# Patient Record
Sex: FEMALE | Race: WHITE | NOT HISPANIC OR LATINO
[De-identification: names, ages, dates, MRNs, and addresses within clinical notes are randomized per-mention and may not be internally consistent; named-entity substitution may affect disease eponyms.]

---

## 2017-03-20 ENCOUNTER — RX RENEWAL (OUTPATIENT)
Age: 57
End: 2017-03-20

## 2017-03-30 ENCOUNTER — OUTPATIENT (OUTPATIENT)
Dept: OUTPATIENT SERVICES | Facility: HOSPITAL | Age: 57
LOS: 1 days | Discharge: HOME | End: 2017-03-30

## 2017-03-30 ENCOUNTER — APPOINTMENT (OUTPATIENT)
Dept: ENDOCRINOLOGY | Facility: CLINIC | Age: 57
End: 2017-03-30

## 2017-03-30 VITALS
SYSTOLIC BLOOD PRESSURE: 176 MMHG | WEIGHT: 203 LBS | BODY MASS INDEX: 38.33 KG/M2 | HEIGHT: 61 IN | DIASTOLIC BLOOD PRESSURE: 103 MMHG

## 2017-03-30 VITALS — HEART RATE: 75 BPM

## 2017-03-30 DIAGNOSIS — I10 ESSENTIAL (PRIMARY) HYPERTENSION: ICD-10-CM

## 2017-03-30 RX ORDER — INSULIN LISPRO 100 [IU]/ML
100 INJECTION, SOLUTION INTRAVENOUS; SUBCUTANEOUS
Refills: 0 | Status: ACTIVE | COMMUNITY
Start: 2017-03-30

## 2017-03-30 RX ORDER — LORAZEPAM 1 MG/1
1 TABLET ORAL
Refills: 0 | Status: ACTIVE | COMMUNITY
Start: 2017-03-30

## 2017-03-30 RX ORDER — TORSEMIDE 20 MG/1
20 TABLET ORAL DAILY
Refills: 0 | Status: ACTIVE | COMMUNITY
Start: 2017-03-30

## 2017-03-30 RX ORDER — POTASSIUM CHLORIDE 750 MG/1
10 TABLET, FILM COATED, EXTENDED RELEASE ORAL
Refills: 0 | Status: ACTIVE | COMMUNITY
Start: 2017-03-30

## 2017-03-30 RX ORDER — PANTOPRAZOLE 40 MG/1
40 TABLET, DELAYED RELEASE ORAL TWICE DAILY
Refills: 0 | Status: ACTIVE | COMMUNITY
Start: 2017-03-30

## 2017-03-30 RX ORDER — ISOSORBIDE MONONITRATE 60 MG/1
60 TABLET, EXTENDED RELEASE ORAL DAILY
Refills: 0 | Status: ACTIVE | COMMUNITY
Start: 2017-03-30

## 2017-03-30 RX ORDER — ASPIRIN 325 MG/1
325 TABLET, FILM COATED ORAL DAILY
Qty: 30 | Refills: 0 | Status: ACTIVE | COMMUNITY
Start: 2017-03-30

## 2017-03-30 RX ORDER — NIFEDIPINE 90 MG/1
90 TABLET, EXTENDED RELEASE ORAL DAILY
Qty: 30 | Refills: 3 | Status: ACTIVE | COMMUNITY
Start: 2017-03-30

## 2017-03-30 RX ORDER — FOLIC ACID 1 MG/1
1 TABLET ORAL DAILY
Refills: 0 | Status: ACTIVE | COMMUNITY
Start: 2017-03-30

## 2017-03-30 RX ORDER — SAW PALMETTO 160 MG
500 CAPSULE ORAL DAILY
Refills: 0 | Status: ACTIVE | COMMUNITY
Start: 2017-03-30

## 2017-03-30 RX ORDER — ERGOCALCIFEROL 1.25 MG/1
1.25 MG CAPSULE, LIQUID FILLED ORAL
Qty: 1 | Refills: 0 | Status: ACTIVE | COMMUNITY
Start: 2017-03-30

## 2017-03-30 RX ORDER — PREGABALIN 150 MG/1
150 CAPSULE ORAL TWICE DAILY
Refills: 0 | Status: ACTIVE | COMMUNITY
Start: 2017-03-30

## 2017-03-30 RX ORDER — ESCITALOPRAM OXALATE 20 MG/1
20 TABLET, FILM COATED ORAL DAILY
Refills: 0 | Status: ACTIVE | COMMUNITY
Start: 2017-03-30

## 2017-03-30 RX ORDER — CLONIDINE HYDROCHLORIDE 0.2 MG/1
0.2 TABLET ORAL 3 TIMES DAILY
Refills: 0 | Status: ACTIVE | COMMUNITY
Start: 2017-03-30

## 2017-03-30 RX ORDER — BROMOCRIPTINE MESYLATE 0.8 MG/1
0.8 TABLET ORAL DAILY
Refills: 0 | Status: ACTIVE | COMMUNITY
Start: 2017-03-30

## 2017-03-30 RX ORDER — SUCRALFATE 1 G/10ML
1 SUSPENSION ORAL 4 TIMES DAILY
Refills: 0 | Status: ACTIVE | COMMUNITY
Start: 2017-03-30

## 2017-06-27 DIAGNOSIS — E11.65 TYPE 2 DIABETES MELLITUS WITH HYPERGLYCEMIA: ICD-10-CM

## 2017-06-27 DIAGNOSIS — E78.2 MIXED HYPERLIPIDEMIA: ICD-10-CM

## 2017-06-29 ENCOUNTER — OUTPATIENT (OUTPATIENT)
Dept: OUTPATIENT SERVICES | Facility: HOSPITAL | Age: 57
LOS: 1 days | Discharge: HOME | End: 2017-06-29

## 2017-06-29 ENCOUNTER — APPOINTMENT (OUTPATIENT)
Dept: ENDOCRINOLOGY | Facility: CLINIC | Age: 57
End: 2017-06-29

## 2017-06-29 VITALS
SYSTOLIC BLOOD PRESSURE: 182 MMHG | HEART RATE: 86 BPM | HEIGHT: 61 IN | DIASTOLIC BLOOD PRESSURE: 86 MMHG | BODY MASS INDEX: 38.14 KG/M2 | WEIGHT: 202 LBS

## 2017-06-29 DIAGNOSIS — E11.65 TYPE 2 DIABETES MELLITUS WITH HYPERGLYCEMIA: ICD-10-CM

## 2017-06-29 DIAGNOSIS — Z79.4 TYPE 2 DIABETES MELLITUS WITH HYPERGLYCEMIA: ICD-10-CM

## 2017-07-11 DIAGNOSIS — E66.01 MORBID (SEVERE) OBESITY DUE TO EXCESS CALORIES: ICD-10-CM

## 2017-07-11 DIAGNOSIS — E10.65 TYPE 1 DIABETES MELLITUS WITH HYPERGLYCEMIA: ICD-10-CM

## 2017-07-11 DIAGNOSIS — E78.00 PURE HYPERCHOLESTEROLEMIA, UNSPECIFIED: ICD-10-CM

## 2017-09-08 ENCOUNTER — RX RENEWAL (OUTPATIENT)
Age: 57
End: 2017-09-08

## 2017-09-21 ENCOUNTER — OUTPATIENT (OUTPATIENT)
Dept: OUTPATIENT SERVICES | Facility: HOSPITAL | Age: 57
LOS: 1 days | Discharge: HOME | End: 2017-09-21

## 2017-09-21 ENCOUNTER — APPOINTMENT (OUTPATIENT)
Dept: ENDOCRINOLOGY | Facility: CLINIC | Age: 57
End: 2017-09-21

## 2017-09-21 VITALS
SYSTOLIC BLOOD PRESSURE: 173 MMHG | HEART RATE: 76 BPM | HEIGHT: 61 IN | BODY MASS INDEX: 37.76 KG/M2 | WEIGHT: 200 LBS | DIASTOLIC BLOOD PRESSURE: 97 MMHG

## 2017-09-21 DIAGNOSIS — E66.01 MORBID (SEVERE) OBESITY DUE TO EXCESS CALORIES: ICD-10-CM

## 2017-09-21 DIAGNOSIS — E78.5 HYPERLIPIDEMIA, UNSPECIFIED: ICD-10-CM

## 2017-09-21 DIAGNOSIS — I70.8 ATHEROSCLEROSIS OF OTHER ARTERIES: ICD-10-CM

## 2017-09-21 RX ORDER — SPIRONOLACTONE 50 MG/1
50 TABLET ORAL DAILY
Refills: 0 | Status: ACTIVE | COMMUNITY
Start: 2017-03-30

## 2017-09-21 RX ORDER — LIDOCAINE 5% 700 MG/1
5 PATCH TOPICAL
Refills: 0 | Status: ACTIVE | COMMUNITY
Start: 2017-03-30

## 2017-09-22 PROBLEM — E66.01 MORBID OR SEVERE OBESITY DUE TO EXCESS CALORIES: Status: ACTIVE | Noted: 2017-09-22

## 2017-09-22 PROBLEM — I70.8 ATHEROSCLEROSIS OF ARTERIES: Status: ACTIVE | Noted: 2017-03-20

## 2017-10-05 DIAGNOSIS — E10.65 TYPE 1 DIABETES MELLITUS WITH HYPERGLYCEMIA: ICD-10-CM

## 2017-10-05 DIAGNOSIS — E66.01 MORBID (SEVERE) OBESITY DUE TO EXCESS CALORIES: ICD-10-CM

## 2017-10-05 DIAGNOSIS — E78.2 MIXED HYPERLIPIDEMIA: ICD-10-CM

## 2017-11-08 ENCOUNTER — RX RENEWAL (OUTPATIENT)
Age: 57
End: 2017-11-08

## 2017-11-08 RX ORDER — EZETIMIBE 10 MG/1
10 TABLET ORAL DAILY
Qty: 90 | Refills: 3 | Status: ACTIVE | COMMUNITY
Start: 2017-11-08 | End: 1900-01-01

## 2017-12-14 ENCOUNTER — APPOINTMENT (OUTPATIENT)
Dept: ENDOCRINOLOGY | Facility: CLINIC | Age: 57
End: 2017-12-14

## 2017-12-14 ENCOUNTER — OUTPATIENT (OUTPATIENT)
Dept: OUTPATIENT SERVICES | Facility: HOSPITAL | Age: 57
LOS: 1 days | Discharge: HOME | End: 2017-12-14

## 2017-12-14 VITALS
HEIGHT: 61 IN | WEIGHT: 200 LBS | BODY MASS INDEX: 37.76 KG/M2 | HEART RATE: 76 BPM | RESPIRATION RATE: 16 BRPM | SYSTOLIC BLOOD PRESSURE: 189 MMHG | DIASTOLIC BLOOD PRESSURE: 76 MMHG

## 2017-12-18 DIAGNOSIS — E78.2 MIXED HYPERLIPIDEMIA: ICD-10-CM

## 2017-12-18 DIAGNOSIS — E78.5 HYPERLIPIDEMIA, UNSPECIFIED: ICD-10-CM

## 2017-12-18 DIAGNOSIS — E10.65 TYPE 1 DIABETES MELLITUS WITH HYPERGLYCEMIA: ICD-10-CM

## 2018-04-04 ENCOUNTER — APPOINTMENT (OUTPATIENT)
Dept: ENDOCRINOLOGY | Facility: CLINIC | Age: 58
End: 2018-04-04

## 2018-04-04 ENCOUNTER — OUTPATIENT (OUTPATIENT)
Dept: OUTPATIENT SERVICES | Facility: HOSPITAL | Age: 58
LOS: 1 days | Discharge: HOME | End: 2018-04-04

## 2018-04-04 VITALS
DIASTOLIC BLOOD PRESSURE: 76 MMHG | WEIGHT: 199 LBS | SYSTOLIC BLOOD PRESSURE: 159 MMHG | HEIGHT: 61 IN | HEART RATE: 75 BPM | BODY MASS INDEX: 37.57 KG/M2

## 2018-04-04 DIAGNOSIS — E10.65 TYPE 1 DIABETES MELLITUS WITH HYPERGLYCEMIA: ICD-10-CM

## 2018-04-04 DIAGNOSIS — J45.41 MODERATE PERSISTENT ASTHMA WITH (ACUTE) EXACERBATION: ICD-10-CM

## 2018-04-04 DIAGNOSIS — E11.9 TYPE 2 DIABETES MELLITUS W/OUT COMPLICATIONS: ICD-10-CM

## 2018-04-04 RX ORDER — BLOOD SUGAR DIAGNOSTIC
STRIP MISCELLANEOUS 4 TIMES DAILY
Qty: 400 | Refills: 2 | Status: ACTIVE | COMMUNITY
Start: 2018-04-04 | End: 1900-01-01

## 2018-04-04 RX ORDER — ALBUTEROL SULFATE 2.5 MG/3ML
(2.5 MG/3ML) SOLUTION RESPIRATORY (INHALATION) 4 TIMES DAILY
Qty: 120 | Refills: 4 | Status: ACTIVE | COMMUNITY
Start: 2018-04-04 | End: 1900-01-01

## 2018-04-04 RX ORDER — CANAGLIFLOZIN AND METFORMIN HYDROCHLORIDE 150; 1000 MG/1; MG/1
150-1000 TABLET, FILM COATED ORAL TWICE DAILY
Qty: 180 | Refills: 3 | Status: ACTIVE | COMMUNITY
Start: 2017-03-30 | End: 1900-01-01

## 2018-04-04 RX ORDER — SEMAGLUTIDE 1.34 MG/ML
2 INJECTION, SOLUTION SUBCUTANEOUS WEEKLY
Qty: 3 | Refills: 0 | Status: ACTIVE | COMMUNITY
Start: 2018-04-04 | End: 1900-01-01

## 2018-04-17 ENCOUNTER — RX RENEWAL (OUTPATIENT)
Age: 58
End: 2018-04-17

## 2018-07-19 ENCOUNTER — APPOINTMENT (OUTPATIENT)
Dept: ENDOCRINOLOGY | Facility: CLINIC | Age: 58
End: 2018-07-19

## 2018-07-23 ENCOUNTER — RX RENEWAL (OUTPATIENT)
Age: 58
End: 2018-07-23

## 2018-07-23 RX ORDER — SEMAGLUTIDE 1.34 MG/ML
2 INJECTION, SOLUTION SUBCUTANEOUS WEEKLY
Qty: 6 | Refills: 3 | Status: ACTIVE | COMMUNITY
Start: 2018-07-23 | End: 1900-01-01

## 2019-03-06 ENCOUNTER — RX RENEWAL (OUTPATIENT)
Age: 59
End: 2019-03-06

## 2019-03-06 RX ORDER — CLOPIDOGREL BISULFATE 75 MG/1
75 TABLET, FILM COATED ORAL
Qty: 90 | Refills: 3 | Status: ACTIVE | COMMUNITY
Start: 2017-03-20 | End: 1900-01-01

## 2019-05-20 ENCOUNTER — RX RENEWAL (OUTPATIENT)
Age: 59
End: 2019-05-20

## 2019-05-20 RX ORDER — INSULIN LISPRO 100 [IU]/ML
100 INJECTION, SOLUTION INTRAVENOUS; SUBCUTANEOUS DAILY
Qty: 120 | Refills: 3 | Status: ACTIVE | COMMUNITY
Start: 2018-06-04 | End: 1900-01-01

## 2019-08-22 ENCOUNTER — OUTPATIENT (OUTPATIENT)
Dept: OUTPATIENT SERVICES | Facility: HOSPITAL | Age: 59
LOS: 1 days | End: 2019-08-22
Payer: MEDICARE

## 2019-08-22 VITALS — WEIGHT: 177.91 LBS | HEIGHT: 62 IN

## 2019-08-22 VITALS
HEART RATE: 57 BPM | HEIGHT: 62 IN | TEMPERATURE: 97 F | WEIGHT: 177.91 LBS | DIASTOLIC BLOOD PRESSURE: 72 MMHG | SYSTOLIC BLOOD PRESSURE: 176 MMHG | RESPIRATION RATE: 18 BRPM | OXYGEN SATURATION: 95 %

## 2019-08-22 DIAGNOSIS — Z95.1 PRESENCE OF AORTOCORONARY BYPASS GRAFT: Chronic | ICD-10-CM

## 2019-08-22 DIAGNOSIS — Z90.710 ACQUIRED ABSENCE OF BOTH CERVIX AND UTERUS: Chronic | ICD-10-CM

## 2019-08-22 DIAGNOSIS — Z95.5 PRESENCE OF CORONARY ANGIOPLASTY IMPLANT AND GRAFT: Chronic | ICD-10-CM

## 2019-08-22 DIAGNOSIS — Z98.890 OTHER SPECIFIED POSTPROCEDURAL STATES: Chronic | ICD-10-CM

## 2019-08-22 DIAGNOSIS — Z01.810 ENCOUNTER FOR PREPROCEDURAL CARDIOVASCULAR EXAMINATION: ICD-10-CM

## 2019-08-22 DIAGNOSIS — I73.9 PERIPHERAL VASCULAR DISEASE, UNSPECIFIED: ICD-10-CM

## 2019-08-22 LAB
ANION GAP SERPL CALC-SCNC: 12 MMOL/L — SIGNIFICANT CHANGE UP (ref 5–17)
APTT BLD: 32.4 SEC — SIGNIFICANT CHANGE UP (ref 27.5–36.3)
BUN SERPL-MCNC: 19 MG/DL — SIGNIFICANT CHANGE UP (ref 8–20)
CALCIUM SERPL-MCNC: 9.8 MG/DL — SIGNIFICANT CHANGE UP (ref 8.6–10.2)
CHLORIDE SERPL-SCNC: 102 MMOL/L — SIGNIFICANT CHANGE UP (ref 98–107)
CO2 SERPL-SCNC: 25 MMOL/L — SIGNIFICANT CHANGE UP (ref 22–29)
CREAT SERPL-MCNC: 0.73 MG/DL — SIGNIFICANT CHANGE UP (ref 0.5–1.3)
GLUCOSE SERPL-MCNC: 240 MG/DL — HIGH (ref 70–115)
HBA1C BLD-MCNC: 8.2 % — HIGH (ref 4–5.6)
HCT VFR BLD CALC: 43.2 % — SIGNIFICANT CHANGE UP (ref 34.5–45)
HGB BLD-MCNC: 14.1 G/DL — SIGNIFICANT CHANGE UP (ref 11.5–15.5)
INR BLD: 1 RATIO — SIGNIFICANT CHANGE UP (ref 0.88–1.16)
MAGNESIUM SERPL-MCNC: 1.9 MG/DL — SIGNIFICANT CHANGE UP (ref 1.6–2.6)
MCHC RBC-ENTMCNC: 29.5 PG — SIGNIFICANT CHANGE UP (ref 27–34)
MCHC RBC-ENTMCNC: 32.6 GM/DL — SIGNIFICANT CHANGE UP (ref 32–36)
MCV RBC AUTO: 90.4 FL — SIGNIFICANT CHANGE UP (ref 80–100)
PLATELET # BLD AUTO: 250 K/UL — SIGNIFICANT CHANGE UP (ref 150–400)
POTASSIUM SERPL-MCNC: 4.4 MMOL/L — SIGNIFICANT CHANGE UP (ref 3.5–5.3)
POTASSIUM SERPL-SCNC: 4.4 MMOL/L — SIGNIFICANT CHANGE UP (ref 3.5–5.3)
PROTHROM AB SERPL-ACNC: 11.5 SEC — SIGNIFICANT CHANGE UP (ref 10–12.9)
RBC # BLD: 4.78 M/UL — SIGNIFICANT CHANGE UP (ref 3.8–5.2)
RBC # FLD: 12.3 % — SIGNIFICANT CHANGE UP (ref 10.3–14.5)
SODIUM SERPL-SCNC: 139 MMOL/L — SIGNIFICANT CHANGE UP (ref 135–145)
WBC # BLD: 7.71 K/UL — SIGNIFICANT CHANGE UP (ref 3.8–10.5)
WBC # FLD AUTO: 7.71 K/UL — SIGNIFICANT CHANGE UP (ref 3.8–10.5)

## 2019-08-22 PROCEDURE — 83735 ASSAY OF MAGNESIUM: CPT

## 2019-08-22 PROCEDURE — 85730 THROMBOPLASTIN TIME PARTIAL: CPT

## 2019-08-22 PROCEDURE — 80048 BASIC METABOLIC PNL TOTAL CA: CPT

## 2019-08-22 PROCEDURE — 36415 COLL VENOUS BLD VENIPUNCTURE: CPT

## 2019-08-22 PROCEDURE — 93010 ELECTROCARDIOGRAM REPORT: CPT

## 2019-08-22 PROCEDURE — G0463: CPT

## 2019-08-22 PROCEDURE — 85027 COMPLETE CBC AUTOMATED: CPT

## 2019-08-22 PROCEDURE — 85610 PROTHROMBIN TIME: CPT

## 2019-08-22 PROCEDURE — 93005 ELECTROCARDIOGRAM TRACING: CPT

## 2019-08-22 PROCEDURE — 83036 HEMOGLOBIN GLYCOSYLATED A1C: CPT

## 2019-08-22 RX ORDER — ASPIRIN/CALCIUM CARB/MAGNESIUM 324 MG
0 TABLET ORAL
Qty: 0 | Refills: 0 | DISCHARGE

## 2019-08-22 RX ORDER — CLOPIDOGREL BISULFATE 75 MG/1
1 TABLET, FILM COATED ORAL
Qty: 0 | Refills: 0 | DISCHARGE

## 2019-08-22 NOTE — H&P PST ADULT - NEUROLOGICAL DETAILS
cranial nerves intact/responds to pain/responds to verbal commands/sensation intact/deep reflexes intact/no spontaneous movement/alert and oriented x 3

## 2019-08-22 NOTE — H&P PST ADULT - NSICDXPASTSURGICALHX_GEN_ALL_CORE_FT
PAST SURGICAL HISTORY:  History of procedure for peripheral vascular disease stent to Right Prox SFA DILLAN 9/11/2018    S/P CABG x 4 LIMA to LAD, SVG to OM, D1 and D2 8/9/12 [facility] Dr. Lr    Stented coronary artery 2.5mm x 14mm RESOLUT CHAPINCITO to RPL 4/2014 PAST SURGICAL HISTORY:  History of procedure for peripheral vascular disease stent to Right Prox SFA DILLAN 9/11/2018    S/P CABG x 4 LIMA to LAD, SVG to OM, D1 and D2 8/9/12 [facility] Dr. Lr    S/P hysterectomy     Stented coronary artery 2.5mm x 14mm RESOLUT CHAPINCITO to RPL 4/2014

## 2019-08-22 NOTE — H&P PST ADULT - OTHER CARE PROVIDERS
Dr. Laws (Interventional Cardiologist), Dr. Andrew (Pulmonologist), Dr. Savage (Neurology), Dr. Camejo (Endocrinologist; [facility]

## 2019-08-22 NOTE — H&P PST ADULT - NSICDXPROBLEM_GEN_ALL_CORE_FT
PROBLEM DIAGNOSES  Problem: Claudication  Assessment and Plan: -peripheral angiogram to be done with Dr. Laws  -procedure d/w patient, risks and benefits explaiend, questions answered  -labs, ECG, diagnostics , previous notes from MD all reviewed  -pt currently holding DAPT for biopsy and was advised per MD to hold; call to Dr. Eric pending PROBLEM DIAGNOSES  Problem: Claudication  Assessment and Plan: -peripheral angiogram to be done with Dr. Laws  -procedure d/w patient, risks and benefits explaiend, questions answered  -labs, ECG, diagnostics , previous notes from MD all reviewed  -pt currently holding DAPT for colonscopy; advised to resume at this time; results normal and call to Dr. Laws to inform  -hold invokana two days pre procedure PROBLEM DIAGNOSES  Problem: Claudication  Assessment and Plan: -peripheral angiogram to be done with Dr. Laws  -procedure d/w patient, risks and benefits explained, questions answered  -labs, ECG, diagnostics , previous notes from MD all reviewed  -pt currently holding DAPT for colonscopy; advised to resume at this time; results normal and call to Dr. Laws to inform  -hold invokana two days pre procedure

## 2019-08-22 NOTE — H&P PST ADULT - HISTORY OF PRESENT ILLNESS
Narrative: This is a 60 y/o white F with a PMHx DM (a1c 8.2, on insulin pump, Humalog), HTN, CAD s/p CABG x4 (2012; [facility] Dr. Lr), PCI (CHAPINCITO to RPL; 2014 Dr. Laws), PVD with PPI (Right pSFA 2018) presents today for PST in anticipation of a Peripheral Angiogram due to worsening claudication; class IIB simone.  She reports that she has tried exercise therapy for PVD and walking beyond the onset of claudication to a moderate/intense level of pain, however, it is worsening and she has been experiencing diaphoresis, RLE swelling, and "excruciating" pain with ambulation. She has been using a cane for ambulation daily and reports she has been bearing all of her weight on her left side.    Her LLE angio in the past had an identified right SFA 80% occlusion, left SFA patent, and left posterior tibial artery occlusion in 2016.     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 She has had a holter monitor in place (SVT identified) and has had two ILR's in place as well (19 and 1/3/19; [employer]).  His bilateral carotid dopplers in 2019 revealed mild carotid atherosclerosis.    Peripheral Artery Duplex Scan 2019 revealed patent stent visualized in the prox SFA , Mid PTA and Dayan A were not visualized, Distal PTA was monophasic with reduce flow suggesting significant stenosis proximal and the right AT was patent.     Previous Revascularizations:  CAB2012 [facility] Dr. Lr; ARDON to LAD, SVG to OM, D1 and D2 arteries  PCI: 2014 [facility] CHAPINCITO to RPL 2.5mm x14mm  PPI: 2018 Right Prox SFA Stent COOK -6-40 PTX    ASA: 3  Mallampati: 2  Bleeding Risk: 3.3%  Creatinine: 0.73  GFR: 90 Narrative: This is a 58 y/o white F with a PMHx DM (a1c 8.2, on insulin pump, Humalog), HTN, CAD s/p CABG x4 (2012; [facility] Dr. Lr), PCI (CHAPINCITO to RPL; 2014 Dr. Laws), PVD with PPI (Right pSFA 2018) presents today for PST in anticipation of a Peripheral Angiogram due to worsening claudication; class IIB simone.  She reports that she has tried exercise therapy for PVD and walking beyond the onset of claudication to a moderate/intense level of pain, however, it is worsening and she has been experiencing diaphoresis, RLE swelling, and "excruciating" pain with ambulation. She has been using a cane for ambulation daily and reports she has been bearing all of her weight on her left side.    Her LLE angio in the past had an identified right SFA 80% occlusion, left SFA patent, and left posterior tibial artery occlusion in 2016.     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 She has had a holter monitor in place (SVT identified) and has had two ILR's in place as well (19 and 1/3/19; [employer]).  His bilateral carotid dopplers in 2019 revealed mild carotid atherosclerosis.    Peripheral Artery Duplex Scan 2019 revealed patent stent visualized in the prox SFA , Mid PTA and Dayan A were not visualized, Distal PTA was monophasic with reduce flow suggesting significant stenosis proximal and the right AT was patent.     TTE 2019 revealed normal LV, no regional wall abnormalities, LVEF 65%, mild AR, mild TR, mild LA enlargement    NST 2019 revealed normal LV,no ST segment changes, normal myocardial perfusion on rest and stress images, LVEF 71%    Previous Revascularizations:  CAB2012 [facility] Dr. Lr; ARDON to LAD, SVG to OM, D1 and D2 arteries  PCI: 2014 [facility] [place] CHAPINCITO to RPL 2.5mm x14mm  PPI: 2018 Right Prox SFA Stent COOK -6-40 PTX    ASA: 3  Mallampati: 2  Bleeding Risk: 3.3%  Creatinine: 0.73  GFR: 90 Narrative: This is a 58 y/o white F with a PMHx DM (a1c 8.2, on insulin pump, Humalog), MARYA (on CPAP) HTN, CAD s/p CABG x4 (2012; [facility] Dr. Lr), PCI (CHAPINCITO to RPL; 2014 Dr. Laws), PVD with PPI (Right pSFA 2018) presents today for PST in anticipation of a Peripheral Angiogram due to worsening claudication; class IIB simone.  She reports that she has tried exercise therapy for PVD and walking beyond the onset of claudication to a moderate/intense level of pain, however, it is worsening and she has been experiencing diaphoresis, RLE swelling, and "excruciating" pain with ambulation. She has been using a cane for ambulation daily and reports she has been bearing all of her weight on her left side.    Her LLE angio in the past had an identified right SFA 80% occlusion, left SFA patent, and left posterior tibial artery occlusion in 2016.     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 She has had a holter monitor in place (SVT identified) and has had two ILR's in place as well (19 and 1/3/19; [employer]).  His bilateral carotid dopplers in 2019 revealed mild carotid atherosclerosis.    Peripheral Artery Duplex Scan 2019 revealed patent stent visualized in the prox SFA , Mid PTA and Dayan A were not visualized, Distal PTA was monophasic with reduce flow suggesting significant stenosis proximal and the right AT was patent.     TTE 2019 revealed normal LV, no regional wall abnormalities, LVEF 65%, mild AR, mild TR, mild LA enlargement    NST 2019 revealed normal LV,no ST segment changes, normal myocardial perfusion on rest and stress images, LVEF 71%    Previous Revascularizations:  CAB2012 [facility] Dr. Lr; ARDON to LAD, SVG to OM, D1 and D2 arteries  PCI: 2014 [facility] [place] CHAPINCITO to RPL 2.5mm x14mm  PPI: 2018 Right Prox SFA Stent DS [employer] -6-40 PTX    ASA: 3  Mallampati: 2  Bleeding Risk: 3.3%  Creatinine: 0.73  GFR: 90 Narrative: This is a 60 y/o white F with a PMHx DM (a1c 8.2, on insulin pump, Humalog), MARYA (on CPAP) HTN, CAD s/p CABG x4 (2012; [facility] Dr. Lr), PCI (CHAPINCITO to RPL; 2014 Dr. Laws), PVD with PPI (Right pSFA 2018) presents today for PST in anticipation of a Peripheral Angiogram due to worsening claudication; class IIB simone.  She reports that she has tried exercise therapy for PVD and walking beyond the onset of claudication to a moderate/intense level of pain, however, it is worsening and she has been experiencing diaphoresis, RLE swelling, and "excruciating" pain with ambulation. She has been using a cane for ambulation daily and reports she has been bearing all of her weight on her left side.    Her LLE angio in the past had an identified right SFA 80% occlusion, left SFA patent, and left posterior tibial artery occlusion in 2016.     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 She has had a holter monitor in place (SVT identified) and has had two ILR's in place as well (19 and 1/3/19; [employer]).  His bilateral carotid dopplers in 2019 revealed mild carotid atherosclerosis.      She also had a colonoscopy on 2019 which was normal and she was holding her DAPT for procedure; D/W Dr. Laws and pt to resume DAPT today 19 and advised to take the day of her procedure.    Peripheral Artery Duplex Scan 2019 revealed patent stent visualized in the prox SFA , Mid PTA and Dayan A were not visualized, Distal PTA was monophasic with reduce flow suggesting significant stenosis proximal and the right AT was patent.     TTE 2019 revealed normal LV, no regional wall abnormalities, LVEF 65%, mild AR, mild TR, mild LA enlargement    NST 2019 revealed normal LV,no ST segment changes, normal myocardial perfusion on rest and stress images, LVEF 71%    Previous Revascularizations:  CAB2012 [facility] Dr. Lr; ARDON to LAD, SVG to OM, D1 and D2 arteries  PCI: 2014 Resolut Kevin CHAPINCITO to RPL 2.5mm x14mm  PPI: 2018 Right Prox SFA Stent COOK -6-40 PTX    ASA: 3  Mallampati: 2  Bleeding Risk: 3.3%  Creatinine: 0.73  GFR: 90

## 2019-08-22 NOTE — H&P PST ADULT - NEGATIVE NEUROLOGICAL SYMPTOMS
no weakness/no transient paralysis/no paresthesias/no generalized seizures/no tremors/no vertigo/no loss of sensation/no syncope/no difficulty walking/no focal seizures

## 2019-08-22 NOTE — H&P PST ADULT - VASCULAR
detailed exam as per daughter " she can't go and she took miralax 24 hours ago and enema at noon today and now she's having diarrhea, uncontrollable '

## 2019-08-22 NOTE — H&P PST ADULT - RS GEN PE MLT RESP DETAILS PC
normal/no chest wall tenderness/clear to auscultation bilaterally/airway patent/good air movement/breath sounds equal/respirations non-labored

## 2019-08-22 NOTE — H&P PST ADULT - NSICDXPASTMEDICALHX_GEN_ALL_CORE_FT
PAST MEDICAL HISTORY:  CAD (coronary artery disease)     Carotid stenosis, bilateral     Class II claudication     Diabetes mellitus     H/O unstable angina     HTN (hypertension)     PVD (peripheral vascular disease) PAST MEDICAL HISTORY:  CAD (coronary artery disease)     Carotid stenosis, bilateral     Class II claudication     Diabetes mellitus     H/O unstable angina     HTN (hypertension)     MARYA on CPAP     PVD (peripheral vascular disease)     Uterine cancer

## 2019-08-22 NOTE — H&P PST ADULT - ASSESSMENT
58 y/o F with CAD/CABGx4, RPL PCI, PVD Right Prox SFA stent presents with worsening claudication and swelling to the right lower extremity; here for Peripheral angiogram with Dr. Laws.

## 2019-08-22 NOTE — H&P PST ADULT - NSICDXFAMILYHX_GEN_ALL_CORE_FT
FAMILY HISTORY:  FH: CABG (coronary artery bypass surgery), mother  FH: myocardial infarction, mother, brother, sister

## 2019-08-27 ENCOUNTER — OUTPATIENT (OUTPATIENT)
Dept: OUTPATIENT SERVICES | Facility: HOSPITAL | Age: 59
LOS: 1 days | Discharge: HOME | End: 2019-08-27
Payer: MEDICARE

## 2019-08-27 ENCOUNTER — TRANSCRIPTION ENCOUNTER (OUTPATIENT)
Age: 59
End: 2019-08-27

## 2019-08-27 VITALS
SYSTOLIC BLOOD PRESSURE: 133 MMHG | RESPIRATION RATE: 16 BRPM | HEART RATE: 48 BPM | DIASTOLIC BLOOD PRESSURE: 59 MMHG | TEMPERATURE: 98 F

## 2019-08-27 VITALS
HEART RATE: 57 BPM | DIASTOLIC BLOOD PRESSURE: 58 MMHG | SYSTOLIC BLOOD PRESSURE: 120 MMHG | OXYGEN SATURATION: 97 % | RESPIRATION RATE: 16 BRPM

## 2019-08-27 DIAGNOSIS — Z98.890 OTHER SPECIFIED POSTPROCEDURAL STATES: Chronic | ICD-10-CM

## 2019-08-27 DIAGNOSIS — Z95.5 PRESENCE OF CORONARY ANGIOPLASTY IMPLANT AND GRAFT: Chronic | ICD-10-CM

## 2019-08-27 DIAGNOSIS — I70.213 ATHEROSCLEROSIS OF NATIVE ARTERIES OF EXTREMITIES WITH INTERMITTENT CLAUDICATION, BILATERAL LEGS: ICD-10-CM

## 2019-08-27 DIAGNOSIS — Z95.1 PRESENCE OF AORTOCORONARY BYPASS GRAFT: Chronic | ICD-10-CM

## 2019-08-27 DIAGNOSIS — Z90.710 ACQUIRED ABSENCE OF BOTH CERVIX AND UTERUS: Chronic | ICD-10-CM

## 2019-08-27 PROCEDURE — 99152 MOD SED SAME PHYS/QHP 5/>YRS: CPT

## 2019-08-27 PROCEDURE — 75716 ARTERY X-RAYS ARMS/LEGS: CPT | Mod: 59

## 2019-08-27 PROCEDURE — C1887: CPT

## 2019-08-27 PROCEDURE — C1769: CPT

## 2019-08-27 PROCEDURE — 37228: CPT

## 2019-08-27 PROCEDURE — C1894: CPT

## 2019-08-27 PROCEDURE — 99153 MOD SED SAME PHYS/QHP EA: CPT

## 2019-08-27 PROCEDURE — C1725: CPT

## 2019-08-27 RX ORDER — SEMAGLUTIDE 0.68 MG/ML
0 INJECTION, SOLUTION SUBCUTANEOUS
Qty: 0 | Refills: 0 | DISCHARGE

## 2019-08-27 RX ORDER — ESCITALOPRAM OXALATE 10 MG/1
0 TABLET, FILM COATED ORAL
Qty: 0 | Refills: 0 | DISCHARGE

## 2019-08-27 RX ORDER — ASPIRIN/CALCIUM CARB/MAGNESIUM 324 MG
1 TABLET ORAL
Qty: 0 | Refills: 0 | DISCHARGE

## 2019-08-27 RX ORDER — ASPIRIN/CALCIUM CARB/MAGNESIUM 324 MG
81 TABLET ORAL ONCE
Refills: 0 | Status: COMPLETED | OUTPATIENT
Start: 2019-08-27 | End: 2019-08-27

## 2019-08-27 RX ORDER — ISOSORBIDE MONONITRATE 60 MG/1
0 TABLET, EXTENDED RELEASE ORAL
Qty: 0 | Refills: 0 | DISCHARGE

## 2019-08-27 RX ORDER — CHOLECALCIFEROL (VITAMIN D3) 125 MCG
1 CAPSULE ORAL
Qty: 0 | Refills: 0 | DISCHARGE

## 2019-08-27 RX ORDER — FOLIC ACID 0.8 MG
1 TABLET ORAL
Qty: 0 | Refills: 0 | DISCHARGE

## 2019-08-27 RX ORDER — PANTOPRAZOLE SODIUM 20 MG/1
0 TABLET, DELAYED RELEASE ORAL
Qty: 0 | Refills: 0 | DISCHARGE

## 2019-08-27 RX ORDER — CARVEDILOL PHOSPHATE 80 MG/1
1 CAPSULE, EXTENDED RELEASE ORAL
Qty: 0 | Refills: 0 | DISCHARGE

## 2019-08-27 RX ORDER — CANAGLIFLOZIN AND METFORMIN HYDROCHLORIDE 50; 500 MG/1; MG/1
1 TABLET, FILM COATED, EXTENDED RELEASE ORAL
Qty: 0 | Refills: 0 | DISCHARGE

## 2019-08-27 RX ORDER — ASCORBIC ACID 60 MG
1 TABLET,CHEWABLE ORAL
Qty: 0 | Refills: 0 | DISCHARGE

## 2019-08-27 RX ORDER — RANOLAZINE 500 MG/1
1 TABLET, FILM COATED, EXTENDED RELEASE ORAL
Qty: 0 | Refills: 0 | DISCHARGE

## 2019-08-27 RX ORDER — INSULIN LISPRO 100/ML
0 VIAL (ML) SUBCUTANEOUS
Qty: 0 | Refills: 0 | DISCHARGE

## 2019-08-27 RX ORDER — ALIROCUMAB 75 MG/ML
0 INJECTION, SOLUTION SUBCUTANEOUS
Qty: 0 | Refills: 0 | DISCHARGE

## 2019-08-27 RX ORDER — POTASSIUM CHLORIDE 20 MEQ
0 PACKET (EA) ORAL
Qty: 0 | Refills: 0 | DISCHARGE

## 2019-08-27 RX ORDER — CILOSTAZOL 100 MG/1
1 TABLET ORAL
Qty: 60 | Refills: 2
Start: 2019-08-27 | End: 2019-11-24

## 2019-08-27 RX ORDER — ROSUVASTATIN CALCIUM 5 MG/1
1 TABLET ORAL
Qty: 0 | Refills: 0 | DISCHARGE

## 2019-08-27 RX ORDER — NITROGLYCERIN 6.5 MG
0 CAPSULE, EXTENDED RELEASE ORAL
Qty: 0 | Refills: 0 | DISCHARGE

## 2019-08-27 RX ORDER — SPIRONOLACTONE 25 MG/1
0 TABLET, FILM COATED ORAL
Qty: 0 | Refills: 0 | DISCHARGE

## 2019-08-27 RX ORDER — HYDRALAZINE HCL 50 MG
0 TABLET ORAL
Qty: 0 | Refills: 0 | DISCHARGE

## 2019-08-27 RX ORDER — CILOSTAZOL 100 MG/1
50 TABLET ORAL EVERY 12 HOURS
Refills: 0 | Status: DISCONTINUED | OUTPATIENT
Start: 2019-08-27 | End: 2019-09-11

## 2019-08-27 RX ORDER — DOXAZOSIN MESYLATE 4 MG
1 TABLET ORAL
Qty: 0 | Refills: 0 | DISCHARGE

## 2019-08-27 RX ORDER — CLOPIDOGREL BISULFATE 75 MG/1
1 TABLET, FILM COATED ORAL
Qty: 0 | Refills: 0 | DISCHARGE

## 2019-08-27 RX ORDER — CILOSTAZOL 100 MG/1
1 TABLET ORAL
Qty: 0 | Refills: 0 | DISCHARGE
Start: 2019-08-27

## 2019-08-27 RX ORDER — CLOPIDOGREL BISULFATE 75 MG/1
75 TABLET, FILM COATED ORAL ONCE
Refills: 0 | Status: COMPLETED | OUTPATIENT
Start: 2019-08-27 | End: 2019-08-27

## 2019-08-27 RX ADMIN — Medication 81 MILLIGRAM(S): at 08:55

## 2019-08-27 RX ADMIN — CILOSTAZOL 50 MILLIGRAM(S): 100 TABLET ORAL at 14:26

## 2019-08-27 RX ADMIN — CLOPIDOGREL BISULFATE 75 MILLIGRAM(S): 75 TABLET, FILM COATED ORAL at 08:55

## 2019-08-27 NOTE — DISCHARGE NOTE PROVIDER - CARE PROVIDER_API CALL
Jeaneth Laws (MD)  Cardiovascular Disease  [address], [address]  [place] [address]  Phone: [phone]  Fax: [phone]  Follow Up Time:

## 2019-08-27 NOTE — PROGRESS NOTE ADULT - ASSESSMENT
60 y/o F with  PAD worsening claudication here for a peripheral angiogram with Dr. Laws    -consent to be obtained  -procedure d/w patient; risks and benefits explained, questions answered  -asa and plavix pre angio

## 2019-08-27 NOTE — DISCHARGE NOTE PROVIDER - HOSPITAL COURSE
59y  Female is now s/p right lower extremity angio/peripheral PTA via right DP approach (band in place; no complications) with Dr. Veto BAUM of prox. anterior tibial    [id number] pacific plus    cath quick cross [id number]    Heparin used 5,000 units    contrast 50ml    IVF 100cc    EBL 10cc        General: No fatigue, no fevers/chills    Respiratory: No dyspnea, no cough, no wheeze    CV: No chest pain, no palpitations    Abd: No nausea    Neuro: No headache, no dizziness        CM: SR    Neuro: A&OX3, CN 2-12 intact    HEENT: NC, AT    Lungs: CTA B/L    CV: S1, S2, no murmur, RRR    Abd: Soft    Extremity: + distal pulses    Right foot/DP: Soft, no bleeding, no hematoma

## 2019-08-27 NOTE — PROGRESS NOTE ADULT - SUBJECTIVE AND OBJECTIVE BOX
This is a 58 y/o white F with a PMHx DM (a1c 8.2, on insulin pump, Humalog), MARYA (on CPAP) HTN, CAD s/p CABG x4 (2012; [facility] Dr. Lr), PCI (CHAPINCITO to RPL; 2014 Dr. Laws), PVD with PPI (Right pSFA 2018) presents today for a Peripheral Angiogram due to worsening claudication; class IIB simone.  She reports that she has tried exercise therapy for PVD and walking beyond the onset of claudication to a moderate/intense level of pain, however, it is worsening and she has been experiencing diaphoresis, RLE swelling, and "excruciating" pain with ambulation. She has been using a cane for ambulation daily and reports she has been bearing all of her weight on her left side.    Her LLE angio in the past had an identified right SFA 80% occlusion, left SFA patent, and left posterior tibial artery occlusion in 2016.   She is currently free from chest pain, palps, PHILLIPS; endorses RLE pain/claudication      Previous Revascularizations:  CAB2012 [facility] Dr. Lr; ARDON to LAD, SVG to OM, D1 and D2 arteries  PCI: 2014 Resolut Kevin CHAPINCITO to RPL 2.5mm x14mm  PPI: 2018 Right Prox SFA Stent COOK -6-40 PTX    ASA: 3  Mallampati: 2  Bleeding Risk: 3.3%  Creatinine: 0.73  GFR: 90                                         	    Appearance: Normal	  HEENT:   Normal oral mucosa, PERRL, EOMI	  Lymphatic: No lymphadenopathy  Cardiovascular: Normal S1 S2, No JVD, No murmurs, No edema  Respiratory: Lungs clear to auscultation	  Psychiatry: A & O x 3, Mood & affect appropriate  Gastrointestinal:  Soft, Non-tender, + BS	  Skin: No rashes, No ecchymoses, No cyanosis  Neurologic: Non-focal  Extremities: Normal range of motion, No clubbing, cyanosis or edema  Vascular: Peripheral pulses palpable 2+ bilaterally

## 2019-08-27 NOTE — DISCHARGE NOTE PROVIDER - NSDCFUADDINST_GEN_ALL_CORE_FT
No heavy lifting, driving, sex, tub baths, swimming, or any activity that submerges the lower half of the body in water for 48 hours.  Limited walking and stairs for 48 hours.    Change the bandaid after 24 hours and every 24 hours after that.  Keep the puncture site dry and covered with a bandaid until a scab forms.    Observe the site frequently.   Notify your physician of pain, swelling or any drainage.    Notify your physician immediately if coldness, numbness, discoloration or pain in your foot occurs.

## 2019-08-27 NOTE — DISCHARGE NOTE PROVIDER - NSDCCPCAREPLAN_GEN_ALL_CORE_FT
PRINCIPAL DISCHARGE DIAGNOSIS  Diagnosis: History of procedure for peripheral vascular disease  Assessment and Plan of Treatment: POBA to prox anterior tibial artery  add pletal (cilostazol) 50mg q12h daily  follow up with Dr. Laws outpt  continue your DAPT (dual anti platelet therapy) with aspirin and plavix daily; these medications are anti platelet and will optimize peripheral blood flow by reducing clot formation

## 2019-08-27 NOTE — PROGRESS NOTE ADULT - SUBJECTIVE AND OBJECTIVE BOX
Department of Cardiology                                                                  [facility] [address] [address]                                                            Telephone: [phone]. Fax:[phone]                                                                                 Cardiac Cath NP Note       Narrative:  59y  Female is now s/p right lower extremity angio/peripheral PTA via right DP approach (band in place; no complications) with Dr. Veto BAUM of prox. anterior tibial  [id number] pacific plus  cath quick cross .[id number]  Heparin used 5,000 units  contrast 50ml  IVF 100cc  EBL 10cc         PAST MEDICAL & SURGICAL HISTORY:  Uterine cancer  MARYA on CPAP  H/O unstable angina  Class II claudication  Carotid stenosis, bilateral  HTN (hypertension)  Diabetes mellitus  CAD (coronary artery disease)  PVD (peripheral vascular disease)  S/P hysterectomy  Stented coronary artery: 2.5mm x 14mm RESOLUT CHAPINCITO to RPL 4/2014  History of procedure for peripheral vascular disease: stent to Right Prox SFA COOK 9/11/2018  S/P CABG x 4: LIMA to LAD, SVG to OM, D1 and D2 8/9/12 [facility] Dr. Lr    FAMILY HISTORY:  FH: CABG (coronary artery bypass surgery): mother  FH: myocardial infarction: mother, brother, sister      Home Medications:  aspirin 325 mg oral tablet: 1 tab(s) orally once a day (27 Aug 2019 08:25)  cloNIDine 0.2 mg oral tablet: orally 3 times a day (27 Aug 2019 08:25)  Coreg CR 80 mg oral capsule, extended release: 1 cap(s) orally once a day (in the morning) (27 Aug 2019 08:25)  dicyclomine 10 mg oral capsule: orally every 6 hours, As Needed (27 Aug 2019 08:25)  doxazosin 2 mg oral tablet: 1 tab(s) orally once a day (27 Aug 2019 08:25)  escitalopram 20 mg oral tablet: orally 2 times a day (27 Aug 2019 08:25)  folic acid 1 mg oral tablet: 1 tab(s) orally once a day (27 Aug 2019 08:25)  HumaLOG 100 units/mL injectable solution:  (27 Aug 2019 08:25)  hydrALAZINE 25 mg oral tablet:  (27 Aug 2019 08:25)  Invokamet 150 mg-1000 mg oral tablet: 1 tab(s) orally 2 times a day (with meals) (27 Aug 2019 08:25)  irbesartan-hydrochlorothiazide 300mg-12.5mg oral tablet: 1 tab(s) orally once a day (27 Aug 2019 08:25)  isosorbide mononitrate 60 mg oral tablet, extended release: orally 2 times a day (27 Aug 2019 08:25)  LORazepam 1 mg oral tablet:  (27 Aug 2019 08:25)  Lyrica 150 mg oral capsule: 1 cap(s) orally 2 times a day (27 Aug 2019 08:25)  Nitrostat 0.4 mg sublingual tablet:  (27 Aug 2019 08:25)  Ozempic (0.25 mg or 0.5 mg dose) 2 mg/1.5 mL subcutaneous solution: subcutaneous once a week (27 Aug 2019 08:25)  pantoprazole 40 mg oral delayed release tablet: orally 2 times a day (27 Aug 2019 08:25)  Plavix 75 mg oral tablet: 1 tab(s) orally once a day (27 Aug 2019 08:25)  potassium chloride 10 mEq oral capsule, extended release: orally every other day (27 Aug 2019 08:25)  Praluent Pen 75 mg/mL subcutaneous solution: subcutaneous every 2 weeks (27 Aug 2019 08:25)  ranolazine 500 mg oral tablet, extended release: 1 tab(s) orally 2 times a day (27 Aug 2019 08:25)  rosuvastatin 40 mg oral tablet: 1 tab(s) orally once a day (27 Aug 2019 08:25)  spironolactone 50 mg oral tablet: orally once a day (27 Aug 2019 08:25)  torsemide 20 mg oral tablet: 1 tab(s) orally once a day (27 Aug 2019 08:25)  Vitamin C 500 mg oral capsule: 1 cap(s) orally once a day (27 Aug 2019 08:25)  Vitamin D3 5000 intl units/mL oral liquid: 1 milliliter(s) orally once a day (27 Aug 2019 08:25)    Patient is a 59y old  Female who presents with a chief complaint of Peripheral Angiogram (27 Aug 2019 08:23)      General: No fatigue, no fevers/chills  Respiratory: No dyspnea, no cough, no wheeze  CV: No chest pain, no palpitations  Abd: No nausea  Neuro: No headache, no dizziness  No Known Allergies      Objective:  Vital Signs Last 24 Hrs  T(C): 36.7 (27 Aug 2019 08:26), Max: 36.7 (27 Aug 2019 08:26)  T(F): 98 (27 Aug 2019 08:26), Max: 98 (27 Aug 2019 08:26)  HR: 45 (27 Aug 2019 12:45) (45 - 48)  BP: 125/63 (27 Aug 2019 12:45) (125/63 - 133/68)  BP(mean): --  RR: 16 (27 Aug 2019 12:45) (16 - 16)  SpO2: 95% (27 Aug 2019 12:45) (95% - 95%)    CM: SR  Neuro: A&OX3, CN 2-12 intact  HEENT: NC, AT  Lungs: CTA B/L  CV: S1, S2, no murmur, RRR  Abd: Soft  Extremity: + distal pulses  Right foot/DP: Soft, no bleeding, no hematoma, band in place            -post cardiac cath orders  -radial or groin precautions  -continue current medical therapy  -DAPT (ASA and plavix)  -statin  -add pletal 50mg PO BID  -follow up outpt in 2 weeks with Dr. Laws

## 2019-08-27 NOTE — DISCHARGE NOTE PROVIDER - NSDCCPTREATMENT_GEN_ALL_CORE_FT
PRINCIPAL PROCEDURE  Procedure: Angioplasty of peripheral artery  Findings and Treatment: -right DP artery access  -plain old balloon angioplasty (POBA) of prox. anterior tibial artery  -continue current medications  -added pletal (cilostazol) 50mg q12h  -follow up with Dr. Laws in 2 weeks  -continue with your exercise therapy for PAD

## 2019-08-27 NOTE — DISCHARGE NOTE NURSING/CASE MANAGEMENT/SOCIAL WORK - PATIENT PORTAL LINK FT
You can access the FollowMyHealth Patient Portal offered by [facility] by registering at the following website: [email or web address]. By joining [employer]’s FollowMyHealth portal, you will also be able to view your health information using other applications (apps) compatible with our system.

## 2019-08-29 DIAGNOSIS — Z95.1 PRESENCE OF AORTOCORONARY BYPASS GRAFT: ICD-10-CM

## 2019-08-29 DIAGNOSIS — G47.33 OBSTRUCTIVE SLEEP APNEA (ADULT) (PEDIATRIC): ICD-10-CM

## 2019-08-29 DIAGNOSIS — I65.23 OCCLUSION AND STENOSIS OF BILATERAL CAROTID ARTERIES: ICD-10-CM

## 2019-08-29 DIAGNOSIS — I25.10 ATHEROSCLEROTIC HEART DISEASE OF NATIVE CORONARY ARTERY WITHOUT ANGINA PECTORIS: ICD-10-CM

## 2019-08-29 DIAGNOSIS — I10 ESSENTIAL (PRIMARY) HYPERTENSION: ICD-10-CM

## 2019-08-29 DIAGNOSIS — E11.9 TYPE 2 DIABETES MELLITUS WITHOUT COMPLICATIONS: ICD-10-CM

## 2019-08-29 DIAGNOSIS — Z99.89 DEPENDENCE ON OTHER ENABLING MACHINES AND DEVICES: ICD-10-CM

## 2019-08-29 DIAGNOSIS — Z98.890 OTHER SPECIFIED POSTPROCEDURAL STATES: ICD-10-CM

## 2019-08-29 DIAGNOSIS — I70.213 ATHEROSCLEROSIS OF NATIVE ARTERIES OF EXTREMITIES WITH INTERMITTENT CLAUDICATION, BILATERAL LEGS: ICD-10-CM

## 2024-09-30 PROBLEM — Z86.79 PERSONAL HISTORY OF OTHER DISEASES OF THE CIRCULATORY SYSTEM: Chronic | Status: ACTIVE | Noted: 2019-08-22

## 2024-09-30 PROBLEM — I73.9 PERIPHERAL VASCULAR DISEASE, UNSPECIFIED: Chronic | Status: ACTIVE | Noted: 2019-08-22

## 2024-09-30 PROBLEM — C55 MALIGNANT NEOPLASM OF UTERUS, PART UNSPECIFIED: Chronic | Status: ACTIVE | Noted: 2019-08-22

## 2024-09-30 PROBLEM — I10 ESSENTIAL (PRIMARY) HYPERTENSION: Chronic | Status: ACTIVE | Noted: 2019-08-22

## 2024-09-30 PROBLEM — I25.10 ATHEROSCLEROTIC HEART DISEASE OF NATIVE CORONARY ARTERY WITHOUT ANGINA PECTORIS: Chronic | Status: ACTIVE | Noted: 2019-08-22

## 2024-09-30 PROBLEM — G47.33 OBSTRUCTIVE SLEEP APNEA (ADULT) (PEDIATRIC): Chronic | Status: ACTIVE | Noted: 2019-08-22

## 2024-09-30 PROBLEM — I65.23 OCCLUSION AND STENOSIS OF BILATERAL CAROTID ARTERIES: Chronic | Status: ACTIVE | Noted: 2019-08-22

## 2024-09-30 PROBLEM — E11.9 TYPE 2 DIABETES MELLITUS WITHOUT COMPLICATIONS: Chronic | Status: ACTIVE | Noted: 2019-08-22

## 2024-11-21 ENCOUNTER — APPOINTMENT (OUTPATIENT)
Dept: CARDIOLOGY | Facility: CLINIC | Age: 64
End: 2024-11-21

## 2024-11-21 ENCOUNTER — APPOINTMENT (OUTPATIENT)
Dept: CARDIOLOGY | Facility: CLINIC | Age: 64
End: 2024-11-21
Payer: MEDICARE

## 2024-11-21 ENCOUNTER — NON-APPOINTMENT (OUTPATIENT)
Age: 64
End: 2024-11-21

## 2024-11-21 VITALS
BODY MASS INDEX: 31.72 KG/M2 | RESPIRATION RATE: 14 BRPM | HEART RATE: 69 BPM | WEIGHT: 168 LBS | HEIGHT: 61 IN | SYSTOLIC BLOOD PRESSURE: 204 MMHG | DIASTOLIC BLOOD PRESSURE: 85 MMHG | OXYGEN SATURATION: 97 %

## 2024-11-21 DIAGNOSIS — R03.0 ELEVATED BLOOD-PRESSURE READING, W/OUT DIAGNOSIS OF HYPERTENSION: ICD-10-CM

## 2024-11-21 DIAGNOSIS — I25.118 ATHEROSCLEROTIC HEART DISEASE OF NATIVE CORONARY ARTERY WITH OTHER FORMS OF ANGINA PECTORIS: ICD-10-CM

## 2024-11-21 DIAGNOSIS — C55 MALIGNANT NEOPLASM OF UTERUS, PART UNSPECIFIED: ICD-10-CM

## 2024-11-21 PROCEDURE — 99205 OFFICE O/P NEW HI 60 MIN: CPT | Mod: 25

## 2024-11-21 PROCEDURE — 93784 AMBL BP MNTR W/SOFTWARE: CPT

## 2024-11-21 PROCEDURE — 93000 ELECTROCARDIOGRAM COMPLETE: CPT

## 2024-11-21 RX ORDER — DILTIAZEM HYDROCHLORIDE 240 MG/1
240 CAPSULE, EXTENDED RELEASE ORAL
Qty: 90 | Refills: 3 | Status: ACTIVE | COMMUNITY

## 2024-12-01 PROBLEM — R03.0 WHITE COAT SYNDROME WITHOUT DIAGNOSIS OF HYPERTENSION: Status: ACTIVE | Noted: 2024-12-01

## 2024-12-27 ENCOUNTER — RESULT REVIEW (OUTPATIENT)
Age: 64
End: 2024-12-27

## 2024-12-27 ENCOUNTER — APPOINTMENT (OUTPATIENT)
Dept: CV DIAGNOSITCS | Facility: HOSPITAL | Age: 64
End: 2024-12-27

## 2024-12-27 ENCOUNTER — OUTPATIENT (OUTPATIENT)
Dept: OUTPATIENT SERVICES | Facility: HOSPITAL | Age: 64
LOS: 1 days | End: 2024-12-27
Payer: MEDICARE

## 2024-12-27 DIAGNOSIS — Z98.890 OTHER SPECIFIED POSTPROCEDURAL STATES: Chronic | ICD-10-CM

## 2024-12-27 DIAGNOSIS — Z95.5 PRESENCE OF CORONARY ANGIOPLASTY IMPLANT AND GRAFT: Chronic | ICD-10-CM

## 2024-12-27 DIAGNOSIS — Z90.710 ACQUIRED ABSENCE OF BOTH CERVIX AND UTERUS: Chronic | ICD-10-CM

## 2024-12-27 DIAGNOSIS — Z95.1 PRESENCE OF AORTOCORONARY BYPASS GRAFT: Chronic | ICD-10-CM

## 2024-12-27 DIAGNOSIS — I10 ESSENTIAL (PRIMARY) HYPERTENSION: ICD-10-CM

## 2024-12-27 PROCEDURE — C8929: CPT

## 2024-12-27 PROCEDURE — 93306 TTE W/DOPPLER COMPLETE: CPT | Mod: 26

## 2025-01-16 ENCOUNTER — NON-APPOINTMENT (OUTPATIENT)
Age: 65
End: 2025-01-16

## 2025-01-16 ENCOUNTER — APPOINTMENT (OUTPATIENT)
Dept: CARDIOLOGY | Facility: CLINIC | Age: 65
End: 2025-01-16
Payer: MEDICARE

## 2025-01-16 VITALS
HEIGHT: 61 IN | DIASTOLIC BLOOD PRESSURE: 77 MMHG | HEART RATE: 80 BPM | BODY MASS INDEX: 32.1 KG/M2 | SYSTOLIC BLOOD PRESSURE: 190 MMHG | WEIGHT: 170 LBS | OXYGEN SATURATION: 97 %

## 2025-01-16 DIAGNOSIS — I70.90 UNSPECIFIED ATHEROSCLEROSIS: ICD-10-CM

## 2025-01-16 DIAGNOSIS — E78.5 HYPERLIPIDEMIA, UNSPECIFIED: ICD-10-CM

## 2025-01-16 PROCEDURE — G2211 COMPLEX E/M VISIT ADD ON: CPT

## 2025-01-16 PROCEDURE — 99214 OFFICE O/P EST MOD 30 MIN: CPT

## 2025-01-16 PROCEDURE — 93000 ELECTROCARDIOGRAM COMPLETE: CPT

## 2025-04-21 DIAGNOSIS — Z91.041 RADIOGRAPHIC DYE ALLERGY STATUS: ICD-10-CM

## 2025-04-21 RX ORDER — DIPHENHYDRAMINE HCL 25 MG/1
25 CAPSULE ORAL
Qty: 2 | Refills: 0 | Status: ACTIVE | COMMUNITY
Start: 2025-04-21 | End: 1900-01-01

## 2025-04-21 RX ORDER — PREDNISONE 50 MG/1
50 TABLET ORAL
Qty: 3 | Refills: 0 | Status: COMPLETED | COMMUNITY
Start: 2025-04-21 | End: 2025-04-23

## 2025-04-24 ENCOUNTER — TRANSCRIPTION ENCOUNTER (OUTPATIENT)
Age: 65
End: 2025-04-24

## 2025-04-24 ENCOUNTER — INPATIENT (INPATIENT)
Facility: HOSPITAL | Age: 65
LOS: 0 days | Discharge: HOME | DRG: 675 | End: 2025-04-25
Attending: STUDENT IN AN ORGANIZED HEALTH CARE EDUCATION/TRAINING PROGRAM | Admitting: STUDENT IN AN ORGANIZED HEALTH CARE EDUCATION/TRAINING PROGRAM
Payer: MEDICARE

## 2025-04-24 VITALS
HEIGHT: 59 IN | WEIGHT: 169.98 LBS | SYSTOLIC BLOOD PRESSURE: 149 MMHG | TEMPERATURE: 98 F | HEART RATE: 86 BPM | OXYGEN SATURATION: 97 % | RESPIRATION RATE: 18 BRPM | DIASTOLIC BLOOD PRESSURE: 67 MMHG

## 2025-04-24 DIAGNOSIS — Z95.5 PRESENCE OF CORONARY ANGIOPLASTY IMPLANT AND GRAFT: Chronic | ICD-10-CM

## 2025-04-24 DIAGNOSIS — Z90.710 ACQUIRED ABSENCE OF BOTH CERVIX AND UTERUS: Chronic | ICD-10-CM

## 2025-04-24 DIAGNOSIS — Z98.890 OTHER SPECIFIED POSTPROCEDURAL STATES: Chronic | ICD-10-CM

## 2025-04-24 DIAGNOSIS — Z95.1 PRESENCE OF AORTOCORONARY BYPASS GRAFT: Chronic | ICD-10-CM

## 2025-04-24 DIAGNOSIS — I10 ESSENTIAL (PRIMARY) HYPERTENSION: ICD-10-CM

## 2025-04-24 LAB
ANION GAP SERPL CALC-SCNC: 22 MMOL/L — HIGH (ref 5–17)
BUN SERPL-MCNC: 21 MG/DL — SIGNIFICANT CHANGE UP (ref 7–23)
CALCIUM SERPL-MCNC: 9.9 MG/DL — SIGNIFICANT CHANGE UP (ref 8.4–10.5)
CHLORIDE SERPL-SCNC: 99 MMOL/L — SIGNIFICANT CHANGE UP (ref 96–108)
CO2 SERPL-SCNC: 15 MMOL/L — LOW (ref 22–31)
CREAT SERPL-MCNC: 0.71 MG/DL — SIGNIFICANT CHANGE UP (ref 0.5–1.3)
EGFR: 94 ML/MIN/1.73M2 — SIGNIFICANT CHANGE UP
EGFR: 94 ML/MIN/1.73M2 — SIGNIFICANT CHANGE UP
GLUCOSE BLDC GLUCOMTR-MCNC: 202 MG/DL — HIGH (ref 70–99)
GLUCOSE BLDC GLUCOMTR-MCNC: 276 MG/DL — HIGH (ref 70–99)
GLUCOSE BLDC GLUCOMTR-MCNC: 280 MG/DL — HIGH (ref 70–99)
GLUCOSE SERPL-MCNC: 288 MG/DL — HIGH (ref 70–99)
HCT VFR BLD CALC: 50.8 % — HIGH (ref 34.5–45)
HGB BLD-MCNC: 16.9 G/DL — HIGH (ref 11.5–15.5)
MAGNESIUM SERPL-MCNC: 2.1 MG/DL — SIGNIFICANT CHANGE UP (ref 1.6–2.6)
MCHC RBC-ENTMCNC: 29.1 PG — SIGNIFICANT CHANGE UP (ref 27–34)
MCHC RBC-ENTMCNC: 33.3 G/DL — SIGNIFICANT CHANGE UP (ref 32–36)
MCV RBC AUTO: 87.4 FL — SIGNIFICANT CHANGE UP (ref 80–100)
NRBC BLD AUTO-RTO: 0 /100 WBCS — SIGNIFICANT CHANGE UP (ref 0–0)
PLATELET # BLD AUTO: 280 K/UL — SIGNIFICANT CHANGE UP (ref 150–400)
POTASSIUM SERPL-MCNC: 5.1 MMOL/L — SIGNIFICANT CHANGE UP (ref 3.5–5.3)
POTASSIUM SERPL-SCNC: 5.1 MMOL/L — SIGNIFICANT CHANGE UP (ref 3.5–5.3)
RBC # BLD: 5.81 M/UL — HIGH (ref 3.8–5.2)
RBC # FLD: 12.5 % — SIGNIFICANT CHANGE UP (ref 10.3–14.5)
SODIUM SERPL-SCNC: 136 MMOL/L — SIGNIFICANT CHANGE UP (ref 135–145)
WBC # BLD: 12.86 K/UL — HIGH (ref 3.8–10.5)
WBC # FLD AUTO: 12.86 K/UL — HIGH (ref 3.8–10.5)

## 2025-04-24 PROCEDURE — 99152 MOD SED SAME PHYS/QHP 5/>YRS: CPT

## 2025-04-24 PROCEDURE — 93010 ELECTROCARDIOGRAM REPORT: CPT

## 2025-04-24 PROCEDURE — 75716 ARTERY X-RAYS ARMS/LEGS: CPT | Mod: 26,XU

## 2025-04-24 PROCEDURE — 37236 OPEN/PERQ PLACE STENT 1ST: CPT | Mod: LT

## 2025-04-24 PROCEDURE — 37252 INTRVASC US NONCORONARY 1ST: CPT

## 2025-04-24 PROCEDURE — 75625 CONTRAST EXAM ABDOMINL AORTA: CPT | Mod: 26,52,XU

## 2025-04-24 PROCEDURE — 36245 INS CATH ABD/L-EXT ART 1ST: CPT | Mod: LT

## 2025-04-24 RX ORDER — INSULIN LISPRO 100 U/ML
INJECTION, SOLUTION INTRAVENOUS; SUBCUTANEOUS AT BEDTIME
Refills: 0 | Status: DISCONTINUED | OUTPATIENT
Start: 2025-04-24 | End: 2025-04-25

## 2025-04-24 RX ORDER — INSULIN LISPRO 100 U/ML
5 INJECTION, SOLUTION INTRAVENOUS; SUBCUTANEOUS ONCE
Refills: 0 | Status: COMPLETED | OUTPATIENT
Start: 2025-04-24 | End: 2025-04-24

## 2025-04-24 RX ORDER — LOSARTAN POTASSIUM 100 MG/1
100 TABLET, FILM COATED ORAL DAILY
Refills: 0 | Status: DISCONTINUED | OUTPATIENT
Start: 2025-04-24 | End: 2025-04-25

## 2025-04-24 RX ORDER — DEXTROSE 50 % IN WATER 50 %
12.5 SYRINGE (ML) INTRAVENOUS ONCE
Refills: 0 | Status: DISCONTINUED | OUTPATIENT
Start: 2025-04-24 | End: 2025-04-25

## 2025-04-24 RX ORDER — EZETIMIBE 10 MG/1
1 TABLET ORAL
Refills: 0 | DISCHARGE

## 2025-04-24 RX ORDER — DIPHENHYDRAMINE HCL 12.5MG/5ML
50 ELIXIR ORAL ONCE
Refills: 0 | Status: COMPLETED | OUTPATIENT
Start: 2025-04-24 | End: 2025-04-24

## 2025-04-24 RX ORDER — DILTIAZEM HYDROCHLORIDE 120 MG/1
240 CAPSULE, EXTENDED RELEASE ORAL DAILY
Refills: 0 | Status: DISCONTINUED | OUTPATIENT
Start: 2025-04-24 | End: 2025-04-25

## 2025-04-24 RX ORDER — ROSUVASTATIN CALCIUM 20 MG/1
40 TABLET, FILM COATED ORAL AT BEDTIME
Refills: 0 | Status: DISCONTINUED | OUTPATIENT
Start: 2025-04-24 | End: 2025-04-25

## 2025-04-24 RX ORDER — DEXTROSE 50 % IN WATER 50 %
15 SYRINGE (ML) INTRAVENOUS ONCE
Refills: 0 | Status: DISCONTINUED | OUTPATIENT
Start: 2025-04-24 | End: 2025-04-25

## 2025-04-24 RX ORDER — DEXTROSE 50 % IN WATER 50 %
25 SYRINGE (ML) INTRAVENOUS ONCE
Refills: 0 | Status: DISCONTINUED | OUTPATIENT
Start: 2025-04-24 | End: 2025-04-25

## 2025-04-24 RX ORDER — SODIUM CHLORIDE 9 G/1000ML
1000 INJECTION, SOLUTION INTRAVENOUS
Refills: 0 | Status: DISCONTINUED | OUTPATIENT
Start: 2025-04-24 | End: 2025-04-25

## 2025-04-24 RX ORDER — ASPIRIN 325 MG
325 TABLET ORAL DAILY
Refills: 0 | Status: DISCONTINUED | OUTPATIENT
Start: 2025-04-24 | End: 2025-04-25

## 2025-04-24 RX ORDER — CLOPIDOGREL BISULFATE 75 MG/1
1 TABLET, FILM COATED ORAL
Refills: 0 | DISCHARGE

## 2025-04-24 RX ORDER — CLOPIDOGREL BISULFATE 75 MG/1
75 TABLET, FILM COATED ORAL DAILY
Refills: 0 | Status: DISCONTINUED | OUTPATIENT
Start: 2025-04-24 | End: 2025-04-25

## 2025-04-24 RX ORDER — CARVEDILOL 3.12 MG/1
25 TABLET, FILM COATED ORAL EVERY 12 HOURS
Refills: 0 | Status: DISCONTINUED | OUTPATIENT
Start: 2025-04-24 | End: 2025-04-25

## 2025-04-24 RX ORDER — DOXAZOSIN MESYLATE 8 MG/1
8 TABLET ORAL DAILY
Refills: 0 | Status: DISCONTINUED | OUTPATIENT
Start: 2025-04-24 | End: 2025-04-25

## 2025-04-24 RX ORDER — GLUCAGON 3 MG/1
1 POWDER NASAL ONCE
Refills: 0 | Status: DISCONTINUED | OUTPATIENT
Start: 2025-04-24 | End: 2025-04-25

## 2025-04-24 RX ORDER — TORSEMIDE 10 MG
20 TABLET ORAL DAILY
Refills: 0 | Status: DISCONTINUED | OUTPATIENT
Start: 2025-04-24 | End: 2025-04-25

## 2025-04-24 RX ORDER — EZETIMIBE 10 MG/1
10 TABLET ORAL DAILY
Refills: 0 | Status: DISCONTINUED | OUTPATIENT
Start: 2025-04-24 | End: 2025-04-25

## 2025-04-24 RX ORDER — DOXAZOSIN MESYLATE 8 MG/1
1 TABLET ORAL
Refills: 0 | DISCHARGE

## 2025-04-24 RX ORDER — INSULIN LISPRO 100 U/ML
INJECTION, SOLUTION INTRAVENOUS; SUBCUTANEOUS
Refills: 0 | Status: DISCONTINUED | OUTPATIENT
Start: 2025-04-24 | End: 2025-04-25

## 2025-04-24 RX ORDER — ISOSORBIDE MONONITRATE 60 MG/1
60 TABLET, EXTENDED RELEASE ORAL DAILY
Refills: 0 | Status: DISCONTINUED | OUTPATIENT
Start: 2025-04-24 | End: 2025-04-25

## 2025-04-24 RX ORDER — EVOLOCUMAB 140 MG/ML
140 INJECTION, SOLUTION SUBCUTANEOUS
Refills: 0 | DISCHARGE

## 2025-04-24 RX ADMIN — ROSUVASTATIN CALCIUM 40 MILLIGRAM(S): 20 TABLET, FILM COATED ORAL at 21:36

## 2025-04-24 RX ADMIN — Medication 0.3 MILLIGRAM(S): at 21:36

## 2025-04-24 RX ADMIN — Medication 50 MILLIGRAM(S): at 13:49

## 2025-04-24 RX ADMIN — INSULIN LISPRO 1: 100 INJECTION, SOLUTION INTRAVENOUS; SUBCUTANEOUS at 21:48

## 2025-04-24 RX ADMIN — CARVEDILOL 25 MILLIGRAM(S): 3.12 TABLET, FILM COATED ORAL at 17:34

## 2025-04-24 RX ADMIN — Medication 100 MILLIGRAM(S): at 21:36

## 2025-04-24 RX ADMIN — INSULIN LISPRO 4: 100 INJECTION, SOLUTION INTRAVENOUS; SUBCUTANEOUS at 16:52

## 2025-04-24 RX ADMIN — Medication 75 MILLILITER(S): at 16:51

## 2025-04-24 RX ADMIN — INSULIN LISPRO 5 UNIT(S): 100 INJECTION, SOLUTION INTRAVENOUS; SUBCUTANEOUS at 12:39

## 2025-04-24 NOTE — DISCHARGE NOTE PROVIDER - CARE PROVIDER_API CALL
Shay Dumont  Interventional Cardiology  [address] [place] [id number]  Phone: [phone]  Fax: [phone]  Established Patient  Follow Up Time:

## 2025-04-24 NOTE — PATIENT PROFILE ADULT - FALL HARM RISK - RISK INTERVENTIONS

## 2025-04-24 NOTE — DISCHARGE NOTE PROVIDER - HOSPITAL COURSE
HPI:  64 year old F w/ pmh of T2DM ( on Insulin pump for 30 years , she removed it at home), CAD CHAPINCITO to RPL 4/2014, PVD: stent to Right Prox SFA COOK 9/11/2018, S/P CABG x 4: LIMA to LAD, SVG to OM, D1 and D2 8/9/12 [facility] Dr. Lr , HLD, asthma, Uncontrolled HTN, uterine CA who presents for a renal artery angiogram and possible intervention with Dr. Chatman and Dr. Abraham. She has had uncontrolled HTN and has been on 7 antihypertensives with systolic BPs still in the 200a despite losing 40 lbs. She tries to watch her diet and salt intake . She has no hx of symptoms of headache or sob. She has had a secondary workup which has remained negative. She currently denies chest pain, dizziness, SOB, numbness.tingling.  Cards: Dr. Dumont    Pt underwent left renal angiogram with renal stent placement on 4/24 via LFA, site closed with perclose intervention.  Post procedure, Patient remained on bed rest x 2 hours . Patient remained hemodynamically stable, neurovascularly intact and chest pain free. Patient voided and ambulated and had no EKG changes post procedure.  He remained overnight for observation and pain control.  The remainder of his hospitalization was uneventful.  He was provided education regarding DAPT/statin therapy, as well as post procedure wound care instructions.  He will follow up with his private cardiologist Dr. Porter in 2 weeks and advised to return to ER with any concerning symptoms.         HPI:  64 year old F w/ pmh of T2DM ( on Insulin pump for 30 years , she removed it at home), CAD CHAPINCITO to RPL 4/2014, PVD: stent to Right Prox SFA COOK 9/11/2018, S/P CABG x 4: LIMA to LAD, SVG to OM, D1 and D2 8/9/12 [facility] Dr. Lr , HLD, asthma, Uncontrolled HTN, uterine CA who presents for a renal artery angiogram and possible intervention with Dr. Chatman and Dr. Abraham. She has had uncontrolled HTN and has been on 7 antihypertensives with systolic BPs still in the 200a despite losing 40 lbs. She tries to watch her diet and salt intake . She has no hx of symptoms of headache or sob. She has had a secondary workup which has remained negative. She currently denies chest pain, dizziness, SOB, numbness.tingling.  Cards: Dr. Dumont    Pt underwent left renal angiogram with renal stent placement on 4/24 via LFA, site closed with perclose intervention.  Post procedure, Patient remained on bed rest x 2 hours . Patient remained hemodynamically stable, neurovascularly intact and chest pain free. Patient voided and ambulated and had no EKG changes post procedure.  She remained overnight for observation and pain control.  The remainder of her hospitalization was uneventful. She was provided education regarding DAPT/statin therapy, as well as post procedure wound care instructions.  She will follow up with his private cardiologist Dr. Vivar in 1 week and advised to return to ER with any concerning symptoms.         HPI:  64 year old F w/ pmh of T2DM ( on Insulin pump for 30 years , she removed it at home), CAD CHAPINCITO to RPL 4/2014, PVD: stent to Right Prox SFA COOK 9/11/2018, S/P CABG x 4: LIMA to LAD, SVG to OM, D1 and D2 8/9/12 [facility] Dr. Lr , HLD, asthma, Uncontrolled HTN, uterine CA who presents for a renal artery angiogram and possible intervention with Dr. Chatman and Dr. Abraham. She has had uncontrolled HTN and has been on 7 antihypertensives with systolic BPs still in the 200a despite losing 40 lbs. She tries to watch her diet and salt intake . She has no hx of symptoms of headache or sob. She has had a secondary workup which has remained negative. She currently denies chest pain, dizziness, SOB, numbness.tingling.  Cards: Dr. Dumont    Pt underwent left renal angiogram with renal stent placement on 4/24 via LFA, site closed with perclose intervention.  Post procedure, Patient remained on bed rest x 2 hours . Patient remained hemodynamically stable, neurovascularly intact and chest pain free. Patient voided and ambulated and had no EKG changes post procedure.  She remained overnight for observation and pain control.  The remainder of her hospitalization was uneventful. She was provided education regarding DAPT/statin therapy, as well as post procedure wound care instructions.  She will follow up with her private cardiologist Dr. Vivar on April 29th at 3pm and advised to return to ER with any concerning symptoms.         HPI:  64 year old F w/ pmh of T2DM ( on Insulin pump for 30 years , she removed it at home), CAD CHAPINCITO to RPL 4/2014, PVD: stent to Right Prox SFA COOK 9/11/2018, S/P CABG x 4: LIMA to LAD, SVG to OM, D1 and D2 8/9/12 [facility] Dr. Lr , HLD, asthma, Uncontrolled HTN, uterine CA who presents for a renal artery angiogram and possible intervention with Dr. Chatman and Dr. Abraham. She has had uncontrolled HTN and has been on 7 antihypertensives with systolic BPs still in the 200a despite losing 40 lbs. She tries to watch her diet and salt intake . She has no hx of symptoms of headache or sob. She has had a secondary workup which has remained negative. She currently denies chest pain, dizziness, SOB, numbness.tingling.  Cards: Dr. Dumont    Pt underwent left renal angiogram with renal stent placement on 4/24 via LFA, site closed with perclose intervention.  Post procedure, Patient remained on bed rest x 2 hours. Patient remained hemodynamically stable, neurovascularly intact and chest pain free. Patient voided and ambulated and had no EKG changes post procedure.  She remained overnight for observation and pain control.  The remainder of her hospitalization was uneventful. She was provided education regarding DAPT/statin therapy, as well as post procedure wound care instructions.  She will follow up with her private cardiologist Dr. Vivar on April 29th at 3pm and advised to return to ER with any concerning symptoms.

## 2025-04-24 NOTE — DISCHARGE NOTE PROVIDER - NSDCCPCAREPLAN_GEN_ALL_CORE_FT
PRINCIPAL DISCHARGE DIAGNOSIS  Diagnosis: HTN (hypertension)  Assessment and Plan of Treatment: Continue with your blood pressure medications; eat a heart healthy diet with low salt diet; exercise regularly (consult with your physician or cardiologist first); maintain a heart healthy weight; if you smoke - quit (A resource to help you stop smoking is the [facility] – phone number [phone].); include healthy ways to manage stress. Continue to follow with your primary care physician or cardiologist.        SECONDARY DISCHARGE DIAGNOSES  Diagnosis: DM (diabetes mellitus)  Assessment and Plan of Treatment: Continue to follow with your primary care MD or your endocrinologist.  Follow a heart healthy diabetic diet. If you check your fingerstick glucose at home, call your MD if it is greater than 250mg/dL on 2 occasions or less than 100mg/dL on 2 occasions. Know signs of low blood sugar, such as: dizziness, shakiness, sweating, confusion, hunger, nervousness-drink 4 ounces apple juice if occurs and call your doctor. Know early signs of high blood sugar, such as: frequent urination, increased thirst, blurry vision, fatigue, headache - call your doctor if this occurs. Follow with other practitioners to care for your diabetes, such as ophthamologist and podiatrist.

## 2025-04-24 NOTE — H&P CARDIOLOGY - NSICDXPASTMEDICALHX_GEN_ALL_CORE_FT
PAST MEDICAL HISTORY:  CAD (coronary artery disease)     Carotid stenosis, bilateral     Class II claudication     Diabetes mellitus     H/O unstable angina     HTN (hypertension)     MARYA on CPAP     PVD (peripheral vascular disease)     Uterine cancer

## 2025-04-24 NOTE — DISCHARGE NOTE PROVIDER - NSDCCPTREATMENT_GEN_ALL_CORE_FT
PRINCIPAL PROCEDURE  Procedure: Left renal angiography  Findings and Treatment: s/p renal stent placement

## 2025-04-24 NOTE — ASU PATIENT PROFILE, ADULT - FALL HARM RISK - UNIVERSAL INTERVENTIONS
Bed in lowest position, wheels locked, appropriate side rails in place/Call bell, personal items and telephone in reach/Instruct patient to call for assistance before getting out of bed or chair/Non-slip footwear when patient is out of bed/Park to call system/Physically safe environment - no spills, clutter or unnecessary equipment/Purposeful Proactive Rounding/Room/bathroom lighting operational, light cord in reach

## 2025-04-24 NOTE — H&P CARDIOLOGY - HISTORY OF PRESENT ILLNESS
64 year old F w/ pmh of T2DM ( on Insulin pump for 30 years , she removed it at home), CAD CHAPINCITO to RPL 4/2014, PVD: stent to Right Prox SFA COOK 9/11/2018, S/P CABG x 4: LIMA to LAD, SVG to OM, D1 and D2 8/9/12 [facility] Dr. Lr , HLD, asthma, Uncontrolled HTN, uterine CA who presents for a renal dennervation with Dr. Annmarie Fish. She has had uncontrolled HTN and has been on 7 antihypertensives with systolic BPs still in the 200a despite losing 40 lbs. She tries to watch her diet and salt intake . She has no hx of symptoms of headache or sob. She has had a secondary workup which has remained negative. She currently denies chest pain, dizziness, SOB, numbness.tingling.    Cards: Dr. Dumont    Cardiology Summary  LEFT LOWER EXTREMITY VESSELS: Left anterior tibial: There was a 90 %  stenosis.  RIGHT LOWER EXTREMITY VESSELS: 1. Right CFA patent  2. Right SFA with mild diffuse disease; patent stent in mid segment  3. Right popliteal artery patent  4. Right PTA occluded (not visualized)  5. Right Peroneal artery 100% proximal and fills distal via collaterals  6. Right GABRIEL 90% ostial stenosis  COMPLICATIONS: No complications occurred during the cath lab visit.     1. Left ventricular cavity is normal in size. Left ventricular systolic function is normal with an ejection fraction of 68 % by Sneed's method of disks. There are no regional wall motion abnormalities seen.   2. Normal right ventricular cavity size and normal right ventricular systolic function.   3. Moderate tricuspid regurgitation.   4. Mild aortic regurgitation. The aortic valve is trileaflet and proximal aortic dimensions are normal.   5. No pericardial effusion seen.   6. Compared to the transthoracic echocardiogram performed on 6/17/2007, a deice lead is now visualized and there is increased tricuspid regurgitation.       64 year old F w/ pmh of T2DM ( on Insulin pump for 30 years , she removed it at home), CAD CHAPINCITO to RPL 4/2014, PVD: stent to Right Prox SFA COOK 9/11/2018, S/P CABG x 4: LIMA to LAD, SVG to OM, D1 and D2 8/9/12 [facility] Dr. Lr , HLD, asthma, Uncontrolled HTN, uterine CA who presents for a renal artery angiogram and possible intervention with Dr. Chatman and Dr. Abraham. She has had uncontrolled HTN and has been on 7 antihypertensives with systolic BPs still in the 200a despite losing 40 lbs. She tries to watch her diet and salt intake . She has no hx of symptoms of headache or sob. She has had a secondary workup which has remained negative. She currently denies chest pain, dizziness, SOB, numbness.tingling.    Cards: Dr. Dumont    Cardiology Summary  LEFT LOWER EXTREMITY VESSELS: Left anterior tibial: There was a 90 %  stenosis.  RIGHT LOWER EXTREMITY VESSELS: 1. Right CFA patent  2. Right SFA with mild diffuse disease; patent stent in mid segment  3. Right popliteal artery patent  4. Right PTA occluded (not visualized)  5. Right Peroneal artery 100% proximal and fills distal via collaterals  6. Right GABRIEL 90% ostial stenosis  COMPLICATIONS: No complications occurred during the cath lab visit.     1. Left ventricular cavity is normal in size. Left ventricular systolic function is normal with an ejection fraction of 68 % by Sneed's method of disks. There are no regional wall motion abnormalities seen.   2. Normal right ventricular cavity size and normal right ventricular systolic function.   3. Moderate tricuspid regurgitation.   4. Mild aortic regurgitation. The aortic valve is trileaflet and proximal aortic dimensions are normal.   5. No pericardial effusion seen.   6. Compared to the transthoracic echocardiogram performed on 6/17/2007, a deice lead is now visualized and there is increased tricuspid regurgitation.

## 2025-04-24 NOTE — DISCHARGE NOTE PROVIDER - NSDCFUADDAPPT_GEN_ALL_CORE_FT
Please follow up with your Cardiologist Dr. José Antonio Vivar at your scheduled visit on April 29th at 3pm.

## 2025-04-24 NOTE — H&P CARDIOLOGY - NSICDXPASTSURGICALHX_GEN_ALL_CORE_FT
PAST SURGICAL HISTORY:  History of procedure for peripheral vascular disease stent to Right Prox SFA DILLAN 9/11/2018    S/P CABG x 4 LIMA to LAD, SVG to OM, D1 and D2 8/9/12 [facility] Dr. Lr    S/P hysterectomy     Stented coronary artery 2.5mm x 14mm RESOLUT CHAPINCITO to RPL 4/2014

## 2025-04-25 ENCOUNTER — TRANSCRIPTION ENCOUNTER (OUTPATIENT)
Age: 65
End: 2025-04-25

## 2025-04-25 VITALS — DIASTOLIC BLOOD PRESSURE: 58 MMHG | HEART RATE: 65 BPM | SYSTOLIC BLOOD PRESSURE: 120 MMHG

## 2025-04-25 LAB
GLUCOSE BLDC GLUCOMTR-MCNC: 269 MG/DL — HIGH (ref 70–99)
GLUCOSE BLDC GLUCOMTR-MCNC: 331 MG/DL — HIGH (ref 70–99)

## 2025-04-25 PROCEDURE — 75716 ARTERY X-RAYS ARMS/LEGS: CPT | Mod: XU

## 2025-04-25 PROCEDURE — 93005 ELECTROCARDIOGRAM TRACING: CPT

## 2025-04-25 PROCEDURE — 83735 ASSAY OF MAGNESIUM: CPT

## 2025-04-25 PROCEDURE — C1769: CPT

## 2025-04-25 PROCEDURE — 75625 CONTRAST EXAM ABDOMINL AORTA: CPT | Mod: 52,XU

## 2025-04-25 PROCEDURE — 37252 INTRVASC US NONCORONARY 1ST: CPT

## 2025-04-25 PROCEDURE — 99232 SBSQ HOSP IP/OBS MODERATE 35: CPT

## 2025-04-25 PROCEDURE — 85027 COMPLETE CBC AUTOMATED: CPT

## 2025-04-25 PROCEDURE — C1760: CPT

## 2025-04-25 PROCEDURE — 36245 INS CATH ABD/L-EXT ART 1ST: CPT

## 2025-04-25 PROCEDURE — 37236 OPEN/PERQ PLACE STENT 1ST: CPT

## 2025-04-25 PROCEDURE — C1894: CPT

## 2025-04-25 PROCEDURE — C1874: CPT

## 2025-04-25 PROCEDURE — 82962 GLUCOSE BLOOD TEST: CPT

## 2025-04-25 PROCEDURE — 80048 BASIC METABOLIC PNL TOTAL CA: CPT

## 2025-04-25 PROCEDURE — C1887: CPT

## 2025-04-25 RX ORDER — CARVEDILOL 3.12 MG/1
1 TABLET, FILM COATED ORAL
Refills: 0 | DISCHARGE

## 2025-04-25 RX ORDER — CARVEDILOL 3.12 MG/1
1 TABLET, FILM COATED ORAL
Qty: 60 | Refills: 0
Start: 2025-04-25 | End: 2025-05-24

## 2025-04-25 RX ORDER — CARVEDILOL 3.12 MG/1
12.5 TABLET, FILM COATED ORAL EVERY 12 HOURS
Refills: 0 | Status: DISCONTINUED | OUTPATIENT
Start: 2025-04-25 | End: 2025-04-25

## 2025-04-25 RX ORDER — EPLERENONE 25 MG/1
1 TABLET ORAL
Refills: 0 | DISCHARGE

## 2025-04-25 RX ORDER — DILTIAZEM HYDROCHLORIDE 120 MG/1
1 CAPSULE, EXTENDED RELEASE ORAL
Qty: 0 | Refills: 0 | DISCHARGE
Start: 2025-04-25

## 2025-04-25 RX ORDER — DILTIAZEM HYDROCHLORIDE 120 MG/1
1 CAPSULE, EXTENDED RELEASE ORAL
Refills: 0 | DISCHARGE

## 2025-04-25 RX ADMIN — Medication 100 MILLIGRAM(S): at 06:23

## 2025-04-25 RX ADMIN — INSULIN LISPRO 8: 100 INJECTION, SOLUTION INTRAVENOUS; SUBCUTANEOUS at 07:29

## 2025-04-25 RX ADMIN — CLOPIDOGREL BISULFATE 75 MILLIGRAM(S): 75 TABLET, FILM COATED ORAL at 05:42

## 2025-04-25 RX ADMIN — DOXAZOSIN MESYLATE 8 MILLIGRAM(S): 8 TABLET ORAL at 11:40

## 2025-04-25 RX ADMIN — EZETIMIBE 10 MILLIGRAM(S): 10 TABLET ORAL at 11:40

## 2025-04-25 RX ADMIN — INSULIN LISPRO 6: 100 INJECTION, SOLUTION INTRAVENOUS; SUBCUTANEOUS at 11:41

## 2025-04-25 RX ADMIN — Medication 325 MILLIGRAM(S): at 05:42

## 2025-04-25 NOTE — PROVIDER CONTACT NOTE (OTHER) - ASSESSMENT
A&O x4.  Patient asymptomatic and lying in bed.  Denies pain, palpitations, discomfort, SOB, CP
A&O x4.  Patient asleep and asymptomatic.  Denies pain, palpitations, discomfort, SOB
A&O x4.  Patient asymptomatic and lying in bed.  VS: 105/55, HR 75.  Patient states she does not want to take all her BP medications at this time due to feeling dizzy last night with low SBP of 100s

## 2025-04-25 NOTE — DISCHARGE NOTE NURSING/CASE MANAGEMENT/SOCIAL WORK - NSDCPEFALRISK_GEN_ALL_CORE
For information on Fall & Injury Prevention, visit: [email or web address] OR  [email or web address] OR  https://www.cdc.gov/steadi/patient.html

## 2025-04-25 NOTE — PROVIDER CONTACT NOTE (OTHER) - REASON
/55 this morning and due for BP medications
75 beats AIVR x1 minute
Patient with 27 beats AIVR for 20 seconds

## 2025-04-25 NOTE — PROVIDER CONTACT NOTE (OTHER) - ACTION/TREATMENT ORDERED:
PA notified and aware.  No new orders at this time.  Continue to monitor patient
PA notified and aware.  No new orders at this time.  Continue to monitor patient
PA notified and aware.  No new orders at this time.  AM dose of hydralazine given as per patient request.  Okay to reschedule BP medications and space them apart. Will endorse to oncoming RN.

## 2025-04-25 NOTE — PROGRESS NOTE ADULT - SUBJECTIVE AND OBJECTIVE BOX
[facility] INVASIVE CARDIOLOGY -- Julia Vega, Krista Brown, Arash, Shanita, Joe, Jarad, Parish, Lurdes, Kenan Copeland  Cardiac cath/EP lab - [facility] Team  [phone]     Chief complaint: Patient is a 64y old  Female who presents with a chief complaint of HTN (24 Apr 2025 17:17)      HPI:  64 year old F w/ pmh of T2DM ( on Insulin pump for 30 years , she removed it at home), CAD CHAPINCITO to RPL 4/2014, PVD: stent to Right Prox SFA COOK 9/11/2018, S/P CABG x 4: LIMA to LAD, SVG to OM, D1 and D2 8/9/12 [facility] Dr. Lr , HLD, asthma, Uncontrolled HTN, uterine CA who presents for a renal artery angiogram and possible intervention with Dr. Chatman and Dr. Abraham. She has had uncontrolled HTN and has been on 7 antihypertensives with systolic BPs still in the 200a despite losing 40 lbs. She tries to watch her diet and salt intake . She has no hx of symptoms of headache or sob. She has had a secondary workup which has remained negative. She currently denies chest pain, dizziness, SOB, numbness.tingling.  (24 Apr 2025 12:02)      Subjective/Observations: Patient seen and assessed at bedside. Denies changes in vision, headaches, dizziness, chest pain, palpitations, SOB, abdominal pain, N/V/D, leg pain/edema or any other complaints.       ROS Negative except as per Subjective and HPI      PAST MEDICAL & SURGICAL HISTORY:  PVD (peripheral vascular disease)      CAD (coronary artery disease)      Diabetes mellitus      HTN (hypertension)      Carotid stenosis, bilateral      Class II claudication      H/O unstable angina      MARYA on CPAP      Uterine cancer      S/P CABG x 4  LIMA to LAD, SVG to OM, D1 and D2 8/9/12 [facility] Dr. Lr      History of procedure for peripheral vascular disease  stent to Right Prox SFA COOK 9/11/2018      Stented coronary artery  2.5mm x 14mm RESOLUT CHAPINCITO to RPL 4/2014      S/P hysterectomy      ALLERGIES:  contrast media (iodine-based) (Unknown)      MEDICATIONS:  carvedilol 25 milliGRAM(s) Oral every 12 hours  cloNIDine 0.3 milliGRAM(s) Oral every 8 hours  diltiazem    milliGRAM(s) Oral daily  doxazosin 8 milliGRAM(s) Oral daily  hydrALAZINE 100 milliGRAM(s) Oral three times a day  hydrochlorothiazide 12.5 milliGRAM(s) Oral daily  isosorbide   mononitrate ER Tablet (IMDUR) 60 milliGRAM(s) Oral daily  losartan 100 milliGRAM(s) Oral daily  torsemide 20 milliGRAM(s) Oral daily    aspirin 325 milliGRAM(s) Oral daily      dextrose 50% Injectable 25 Gram(s) IV Push once  dextrose 50% Injectable 12.5 Gram(s) IV Push once  dextrose 50% Injectable 25 Gram(s) IV Push once  dextrose Oral Gel 15 Gram(s) Oral once PRN  ezetimibe 10 milliGRAM(s) Oral daily  glucagon  Injectable 1 milliGRAM(s) IntraMuscular once  insulin lispro (ADMELOG) corrective regimen sliding scale   SubCutaneous three times a day before meals  insulin lispro (ADMELOG) corrective regimen sliding scale   SubCutaneous at bedtime  rosuvastatin 40 milliGRAM(s) Oral at bedtime    clopidogrel Tablet 75 milliGRAM(s) Oral daily  dextrose 5%. 1000 milliLiter(s) IV Continuous <Continuous>  dextrose 5%. 1000 milliLiter(s) IV Continuous <Continuous>  sodium chloride 0.9%. 1000 milliLiter(s) IV Continuous <Continuous>      TELEMETRY:  T(C): 36.6 (04-24-25 @ 19:53), Max: 36.6 (04-24-25 @ 12:03)  HR: 85 (04-24-25 @ 21:25) (80 - 92)  BP: 115/55 (04-24-25 @ 21:25) (115/55 - 170/74)  RR: 16 (04-24-25 @ 21:25) (16 - 18)  SpO2: 91% (04-24-25 @ 21:25) (91% - 97%)  Wt(kg): --  I&O's Summary    24 Apr 2025 07:01  -  25 Apr 2025 00:09  --------------------------------------------------------  IN: 945 mL / OUT: 0 mL / NET: 945 mL      Height (cm): 149.9 (04-24 @ 12:03)  Weight (kg): 77.1 (04-24 @ 12:03)  BMI (kg/m2): 34.3 (04-24 @ 12:03)  BSA (m2): 1.72 (04-24 @ 12:03)  Lee:  Central/PICC/Mid Line:                                         FOCUSED PHYSICAL EXAM:  Appearance: Normal  Cardiovascular: Normal S1 S2, No JVD, No murmurs, rubs, gallops or clicks  Respiratory: Lungs clear to auscultation. No Rales, Rhonchi, Wheezing	  Vascular: left groin site stable w/o bleeding or hematoma, soft, + pulses     ECG:  	  RADIOLOGY:   DIAGNOSTIC TESTING:  [ ] Echocardiogram:    TRANSTHORACIC ECHOCARDIOGRAM REPORT  ________________________________________________________________________________                                      _______       Pt. Name:       JESUS MANUEL WHITE Study Date:    12/27/2024  MRN:            [id number]     YOB: 1960  Accession #:    [id number]      Age:           64 years  Account#:       [id number]   Gender:        F  Heart Rate:                    Height:        61.00 in (154.94 cm)  Rhythm:                        Weight:  166.00 lb (75.30 kg)  Blood Pressure: 186/95 mmHg    BSA/BMI:       1.75 m² / 31.37 kg/m²  ________________________________________________________________________________________  Referring Physician:    [id number] Shay Dumont  Interpreting Physician: Cristine Up  Primary Sonographer:    Lindsay LINDSAY    CPT:                ECHO TTE WITH CON COMP W DOPP - .m;DEFINITY ECHO                      CONTRAST PER ML - .m;DEFINITY ECHO CONTRAST PER ML                      WASTED - .m  Indication(s):      Essential [primary] hypertension - I10  Procedure:          Transthoracic echocardiogram with 2-D, M-mode and complete                      spectral and color flow Doppler.  Ordering Location:  [facility]  Admission Status:   Outpatient  Contrast Injection: Verbal consent was obtained for injection of Ultrasonic                      Enhancing Agent following a discussion of risks and                      benefits.                      Endocardial visualization enhanced with 2 ml ofDefinity                      Ultrasound enhancing agent (Lot#:[id number] Exp.Date:08/2025                      Discarded Dose:8ml).  UEA Reaction:       Patient had no adverse reaction after injection of                      Ultrasound Enhancing Agent.    _______________________________________________________________________________________     CONCLUSIONS:      1. Left ventricular cavity is normal in size. Left ventricular systolic function is normal with an ejection fraction of 68 % by Sneed's method of disks. There are no regional wall motion abnormalities seen.   2. Normal right ventricular cavity size and normal right ventricular systolic function.   3. Moderate tricuspid regurgitation.   4. Mild aortic regurgitation. The aortic valve is trileaflet and proximal aortic dimensions are normal.   5. No pericardial effusion seen.   6. Compared to the transthoracic echocardiogram performed on 6/17/2007, a deice lead is now visualized and there is increased tricuspid regurgitation.    ________________________________________________________________________________________  FINDINGS:     Left Ventricle:  The left ventricular cavity is normal in size. Left ventricular wall thickness is normal. Left ventricular systolic function is normal with a calculated ejection fraction of 68 % by the Sneed's biplane method of disks. There are no regional wall motion abnormalities seen. There is normal LV mass and concentric remodeling. There is normal left ventricular diastolic function, with normal left ventricular filling pressure.     Right Ventricle:  The right ventricular cavity is normal in size and right ventricular systolic function is normal. Tricuspid annular plane systolic excursion (TAPSE) is 2.0 cm (normal >=1.7 cm). A device leadis visualized.     Left Atrium:  The left atrium is normal in size with an indexed volume of 27.85 ml/m².     Right Atrium:  The right atrium is normal in size with an indexed volume of 15.47 ml/m².     Interatrial Septum:  The interatrial septum wasnot well visualized.     Aortic Valve:  The aortic valve is tricuspid with normal leaflet excursion. There is calcification of the aortic valve annulus. There is no aortic valve stenosis. There is mild aortic regurgitation.     Mitral Valve:  There is normal leaflet mobility of the mitral valve. There is mild calcification of the mitral valve annulus. There is no mitral valve stenosis. There is trace mitral regurgitation.     Tricuspid Valve:  The tricuspid valve was not well visualized. There ismoderate tricuspid regurgitation. There is an eccentric tricuspid regurgitant jet. The RVSP may be under-estimated in the setting of an eccentric tricuspid regurgitation jet. Estimated pulmonary artery systolic pressure is 30 mmHg.     Pulmonic Valve:  The pulmonic valve was not well visualized.     Aorta:  The aortic root at the sinuses of Valsalva is normal in size, measuring 2.65 cm (indexed 1.52 cm/m²). The ascending aorta is normal in size, measuring 2.80 cm (indexed 1.60 cm/m²).     Pericardium:  No pericardial effusion seen.     Systemic Veins:  The inferior vena cava is normal in size (normal <2.1cm) with normal inspiratory collapse (normal >50%) consistent with normal right atrial pressure (~3, range 0-5mmHg).  ____________________________________________________________________  QUANTITATIVE DATA:  Left Ventricle Measurements: (Indexed to BSA)     IVSd (2D):   1.0 cm  LVPWd (2D):  1.2 cm  LVIDd (2D):  3.6 cm  LVIDs (2D):  2.4 cm  LV Mass:     122 g  69.6 g/m²  LV Vol d, MOD A2C:111.0 ml 63.60 ml/m²  LV Vol d, MOD A4C: 90.5 ml  51.85 ml/m²  LV Vol d, MOD BP:  102.2 ml 58.56 ml/m²  LV Vol s, MOD A2C: 31.3 ml  17.93 ml/m²  LV Vol s, MOD A4C: 32.7 ml  18.74 ml/m²  LV Vol s, MOD BP:  33.2 ml  19.02 ml/m²  LVOT SV MOD BP:    69.0ml  LV EF% MOD BP:     68 %     MV E Vmax:    0.94 m/s  MV A Vmax:    1.07 m/s  MV E/A:       0.88  e' lateral:   8.70 cm/s  e' medial:    6.74 cm/s  E/e' lateral: 10.78  E/e' medial:  13.92  E/e' Average: 12.15    Aorta Measurements: (Normal range) (Indexed to BSA)     Ao Root d     2.65 cm (2.7 - 3.3 cm) 1.52 cm/m²  Ao Asc d, 2D: 2.80  Ao Asc prox:  2.80 cm                1.60 cm/m²            Left Atrium Measurements: (Indexed to BSA)  LA Diam 2D:        4.50 cm  LA Vol s, MOD A4C: 55.30 ml.  LA Vol s, MOD A2C: 40.30 ml.  LA Vol s, MOD BP:  48.60 ml  27.85 ml/m²         Right Ventricle Measurements: Right Atrial Measurements:     TAPSE:           2.0 cm       RA Vol:            27.00 ml  RV S' Vmax:      8.81 cm/s    RA Vol s, MOD A4C  27.0 ml  RV Base (RVID1): 3.5 cm       RA Vol Index:      15.47 ml/m²  RV Mid (RVID2):  2.5 cm       RA Area s, MOD A4C 12.0 cm²       LVOT / RVOT/ Qp/Qs Data: (Indexed to BSA)  LVOT Diameter:  1.90 cm  LVOT Area:      2.84 cm²  LVOT Vmax:      0.89 m/s  LVOT Vmn:       0.603 m/s  LVOT VTI:       21.00 cm  LVOT peak grad: 3 mmHg  LVOT mean grad: 2.0 mmHg  LVOT SV:        59.5 ml   34.12 ml/m²    Mitral Valve Measurements:     MV E Vmax: 0.9 m/s  MV A Vmax: 1.1 m/s  MV E/A:    0.9       Tricuspid Valve Measurements:     TR Vmax:           2.6 m/s  TR Peak Gradient:  27.5 mmHg  RA Pressure:       3 mmHg  PASP:              30 mmHg  TR PISA Radius:    0.60 cm  TR Aliasing Jesse:   0.38 m/s  TR Inst Flow Rate: 87.1 ml/s  TR Eff VU:        0.33 cm²    ________________________________________________________________________________________  Electronically signed on 12/27/2024 at 1:27:46 PM by Cristine Up         *** Final ***    [ ] Catheterization:  < from: Cardiac Cath Lab - Adult (08.27.19 @ 11:23) >  LEFT LOWER EXTREMITY VESSELS: Left anterior tibial: There was a 90 %  stenosis.  RIGHT LOWER EXTREMITY VESSELS: 1. Right CFA patent  2. Right SFA with mild diffuse disease; patent stent in mid segment  3. Right popliteal artery patent  4. Right PTA occluded (not visualized)  5. Right Peroneal artery 100% proximal and fills distal via collaterals  6. Right GABRIEL 90% ostial stenosis  COMPLICATIONS: No complications occurred during the cath lab visit.  SUMMARY:  Summary: Addendum 8/28/2019: Case reconfirmed to add Interventional PV  Cardiologist to [facility] Interventional PV report  DIAGNOSTIC RECOMMENDATIONS: 1. Aspirin 81mg OD for life  2. Plavix 75mg OD x 1 month  INTERVENTIONAL RECOMMENDATIONS: 1. Aspirin 81mg OD for life  2. Plavix 75mg OD x 1 month    < end of copied text >    [ ] Stress Test:    [ ] JOHN  [ ] CCTA  OTHER: 	    LABS: All labs reviewed	 	  CARDIAC MARKERS:                       16.9   12.86 )-----------( 280      ( 24 Apr 2025 12:22 )             50.8     04-24    136  |  99  |  21  ----------------------------<  288[H]  5.1   |  15[L]  |  0.71    Ca    9.9      24 Apr 2025 12:22  Mg     2.1     04-24      proBNP:   Lipid Profile:   HgA1c:   TSH:

## 2025-04-25 NOTE — DISCHARGE NOTE NURSING/CASE MANAGEMENT/SOCIAL WORK - FINANCIAL ASSISTANCE
[facility] provides services at a reduced cost to those who are determined to be eligible through [facility]’s financial assistance program. Information regarding [facility]’s financial assistance program can be found by going to [email or web address] or by calling [phone].

## 2025-04-25 NOTE — PROGRESS NOTE ADULT - PROVIDER SPECIALTY LIST ADULT
[facility] Per pharmacist, they did receive the script for the tablet form of tizanidine, but the capsule script was not voided out, per pharmacist he will void out the capsule script to stop the messages from coming.

## 2025-04-25 NOTE — DISCHARGE NOTE NURSING/CASE MANAGEMENT/SOCIAL WORK - PATIENT PORTAL LINK FT
You can access the FollowMyHealth Patient Portal offered by [facility] by registering at the following website: [email or web address]. By joining [email or web address] FollowMyHealth portal, you will also be able to view your health information using other applications (apps) compatible with our system.

## 2025-04-25 NOTE — PROGRESS NOTE ADULT - ASSESSMENT
64F Hx HTN, HLD, Type 2 Diabetes, CAD s/p CABG x4 8/9/12 (LIMA-LAD, SVG-OM, D1 & D2), CHAPINCITO x1 RPL 4/2014, PVD/PAD s/p PPI (right pSFA stent COOK 9/2018), CVA x2 (9/2006, 4/2014 - minor right sided weakness), MARYA on CPAP, asthma, uterine cancer presented for renal artery angiogram.     # HTN  - Uncontrolled HTN on 8 antihypertensives w/ SBPs still in 200s  - 4/24/25: left renal angiogram w/ renal stent placement via LFA s/p perclose  - Left groin site stable; soft, nontender, no hematoma  - Continue ASA 325mg qd, Plavix 75mg qd, Rosuvastatin 40mg qd  - Continue Coreg 25mg BID, Clonidine 0.3mg TID, Diltiazem 240mg qd, Hydralazine 100mg TID, Hydrochlorothiazide 12.5mg qd, Imdur 60mg qd, Losartan 100mg qd, Torsemide 20mg qd    # CAD  - s/p CABG x4 8/9/12 (LIMA-LAD, SVG-OM, D1 & D2), CHAPINCITO x1 RPL 4/2014  - Stable  - Continue ASA 325mg qd, Plavix 75mg qd, Rosuvastatin 40mg qhs    # PVD/PAD  - s/p PPI (right pSFA stent COOK 9/2018)  - Stable    # HLD  - Continue Rosuvastatin 40mg qhs, Ezetimibe 10mg qd    # Type 2 Diabetes  - Utilizes insulin pump x30 yrs, however was removed and left at home  - mISS w/ FS qac and qhs  - Resume insulin pump on discharge    # PPx  - DVT ppx: none  - Diet: DASH/TLC  - Dispo: anticipate discharge today    Bethany Turner PA-C  Invasive cardiology  [phone]

## 2025-04-25 NOTE — PROVIDER CONTACT NOTE (OTHER) - SITUATION
/55 this morning and due for BP medications
Patient with 27 beats AIVR for 20 seconds
75 beats AIVR x1 minute

## 2025-04-25 NOTE — PROVIDER CONTACT NOTE (OTHER) - BACKGROUND
Patient admitted for renal angiogram.  PMH of T2 DM, carotid stenosis, HTN, CAD, PVD, HTN.  S/p renal angiogram via left Groin on 4/24/25

## 2025-06-17 ENCOUNTER — APPOINTMENT (OUTPATIENT)
Dept: CARDIOLOGY | Facility: CLINIC | Age: 65
End: 2025-06-17

## 2025-08-21 ENCOUNTER — APPOINTMENT (OUTPATIENT)
Dept: CARDIOLOGY | Facility: CLINIC | Age: 65
End: 2025-08-21